# Patient Record
Sex: MALE | Race: BLACK OR AFRICAN AMERICAN | NOT HISPANIC OR LATINO | Employment: OTHER | URBAN - METROPOLITAN AREA
[De-identification: names, ages, dates, MRNs, and addresses within clinical notes are randomized per-mention and may not be internally consistent; named-entity substitution may affect disease eponyms.]

---

## 2023-08-07 ENCOUNTER — APPOINTMENT (OUTPATIENT)
Dept: MRI IMAGING | Facility: CLINIC | Age: 68
End: 2023-08-07
Attending: EMERGENCY MEDICINE
Payer: COMMERCIAL

## 2023-08-07 ENCOUNTER — APPOINTMENT (OUTPATIENT)
Dept: GENERAL RADIOLOGY | Facility: CLINIC | Age: 68
End: 2023-08-07
Attending: EMERGENCY MEDICINE
Payer: COMMERCIAL

## 2023-08-07 ENCOUNTER — HOSPITAL ENCOUNTER (OUTPATIENT)
Facility: CLINIC | Age: 68
Setting detail: OBSERVATION
Discharge: HOME | End: 2023-08-09
Attending: EMERGENCY MEDICINE | Admitting: HOSPITALIST
Payer: COMMERCIAL

## 2023-08-07 DIAGNOSIS — Z86.73 HISTORY OF CVA (CEREBROVASCULAR ACCIDENT): ICD-10-CM

## 2023-08-07 DIAGNOSIS — G45.9 TIA (TRANSIENT ISCHEMIC ATTACK): ICD-10-CM

## 2023-08-07 DIAGNOSIS — R42 DIZZINESS: ICD-10-CM

## 2023-08-07 DIAGNOSIS — I48.91 ATRIAL FIBRILLATION, UNSPECIFIED TYPE (H): Primary | ICD-10-CM

## 2023-08-07 DIAGNOSIS — R55 NEAR SYNCOPE: ICD-10-CM

## 2023-08-07 LAB
ALBUMIN UR-MCNC: 10 MG/DL
ANION GAP SERPL CALCULATED.3IONS-SCNC: 11 MMOL/L (ref 7–15)
APPEARANCE UR: CLEAR
ATRIAL RATE - MUSE: NORMAL BPM
BILIRUB UR QL STRIP: NEGATIVE
BUN SERPL-MCNC: 14.6 MG/DL (ref 8–23)
CALCIUM SERPL-MCNC: 9.1 MG/DL (ref 8.8–10.2)
CHLORIDE SERPL-SCNC: 104 MMOL/L (ref 98–107)
COLOR UR AUTO: YELLOW
CREAT SERPL-MCNC: 1.36 MG/DL (ref 0.67–1.17)
DEPRECATED HCO3 PLAS-SCNC: 25 MMOL/L (ref 22–29)
DIASTOLIC BLOOD PRESSURE - MUSE: NORMAL MMHG
ERYTHROCYTE [DISTWIDTH] IN BLOOD BY AUTOMATED COUNT: 14.2 % (ref 10–15)
GFR SERPL CREATININE-BSD FRML MDRD: 56 ML/MIN/1.73M2
GLUCOSE SERPL-MCNC: 103 MG/DL (ref 70–99)
GLUCOSE UR STRIP-MCNC: NEGATIVE MG/DL
HBA1C MFR BLD: 5.9 %
HCT VFR BLD AUTO: 45.9 % (ref 40–53)
HGB BLD-MCNC: 15 G/DL (ref 13.3–17.7)
HGB UR QL STRIP: NEGATIVE
HOLD SPECIMEN: NORMAL
HYALINE CASTS: 1 /LPF
INTERPRETATION ECG - MUSE: NORMAL
KETONES UR STRIP-MCNC: NEGATIVE MG/DL
LEUKOCYTE ESTERASE UR QL STRIP: NEGATIVE
MAGNESIUM SERPL-MCNC: 1.8 MG/DL (ref 1.7–2.3)
MCH RBC QN AUTO: 29.2 PG (ref 26.5–33)
MCHC RBC AUTO-ENTMCNC: 32.7 G/DL (ref 31.5–36.5)
MCV RBC AUTO: 90 FL (ref 78–100)
MUCOUS THREADS #/AREA URNS LPF: PRESENT /LPF
NITRATE UR QL: NEGATIVE
NT-PROBNP SERPL-MCNC: 1217 PG/ML (ref 0–900)
P AXIS - MUSE: NORMAL DEGREES
PH UR STRIP: 5.5 [PH] (ref 5–7)
PLATELET # BLD AUTO: 213 10E3/UL (ref 150–450)
POTASSIUM SERPL-SCNC: 4.3 MMOL/L (ref 3.4–5.3)
PR INTERVAL - MUSE: NORMAL MS
QRS DURATION - MUSE: 104 MS
QT - MUSE: 434 MS
QTC - MUSE: 429 MS
R AXIS - MUSE: 50 DEGREES
RBC # BLD AUTO: 5.13 10E6/UL (ref 4.4–5.9)
RBC URINE: 1 /HPF
SODIUM SERPL-SCNC: 140 MMOL/L (ref 136–145)
SP GR UR STRIP: 1.02 (ref 1–1.03)
SYSTOLIC BLOOD PRESSURE - MUSE: NORMAL MMHG
T AXIS - MUSE: 208 DEGREES
T4 FREE SERPL-MCNC: 1.25 NG/DL (ref 0.9–1.7)
TROPONIN T SERPL HS-MCNC: 7 NG/L
TROPONIN T SERPL HS-MCNC: 7 NG/L
TSH SERPL DL<=0.005 MIU/L-ACNC: 5.16 UIU/ML (ref 0.3–4.2)
UROBILINOGEN UR STRIP-MCNC: NORMAL MG/DL
VENTRICULAR RATE- MUSE: 59 BPM
WBC # BLD AUTO: 6 10E3/UL (ref 4–11)
WBC URINE: 1 /HPF

## 2023-08-07 PROCEDURE — G1010 CDSM STANSON: HCPCS

## 2023-08-07 PROCEDURE — G0378 HOSPITAL OBSERVATION PER HR: HCPCS

## 2023-08-07 PROCEDURE — 83880 ASSAY OF NATRIURETIC PEPTIDE: CPT | Performed by: EMERGENCY MEDICINE

## 2023-08-07 PROCEDURE — 84439 ASSAY OF FREE THYROXINE: CPT | Performed by: EMERGENCY MEDICINE

## 2023-08-07 PROCEDURE — 84484 ASSAY OF TROPONIN QUANT: CPT | Performed by: EMERGENCY MEDICINE

## 2023-08-07 PROCEDURE — 84443 ASSAY THYROID STIM HORMONE: CPT | Performed by: EMERGENCY MEDICINE

## 2023-08-07 PROCEDURE — 96361 HYDRATE IV INFUSION ADD-ON: CPT

## 2023-08-07 PROCEDURE — 83735 ASSAY OF MAGNESIUM: CPT | Performed by: EMERGENCY MEDICINE

## 2023-08-07 PROCEDURE — 96360 HYDRATION IV INFUSION INIT: CPT

## 2023-08-07 PROCEDURE — 83036 HEMOGLOBIN GLYCOSYLATED A1C: CPT | Performed by: HOSPITALIST

## 2023-08-07 PROCEDURE — 71046 X-RAY EXAM CHEST 2 VIEWS: CPT

## 2023-08-07 PROCEDURE — 80061 LIPID PANEL: CPT | Performed by: HOSPITALIST

## 2023-08-07 PROCEDURE — 70544 MR ANGIOGRAPHY HEAD W/O DYE: CPT | Mod: MG

## 2023-08-07 PROCEDURE — 99223 1ST HOSP IP/OBS HIGH 75: CPT | Performed by: HOSPITALIST

## 2023-08-07 PROCEDURE — 81001 URINALYSIS AUTO W/SCOPE: CPT | Performed by: EMERGENCY MEDICINE

## 2023-08-07 PROCEDURE — 258N000003 HC RX IP 258 OP 636: Performed by: EMERGENCY MEDICINE

## 2023-08-07 PROCEDURE — 85027 COMPLETE CBC AUTOMATED: CPT | Performed by: EMERGENCY MEDICINE

## 2023-08-07 PROCEDURE — 93005 ELECTROCARDIOGRAM TRACING: CPT

## 2023-08-07 PROCEDURE — 82310 ASSAY OF CALCIUM: CPT | Performed by: EMERGENCY MEDICINE

## 2023-08-07 PROCEDURE — 36415 COLL VENOUS BLD VENIPUNCTURE: CPT | Performed by: EMERGENCY MEDICINE

## 2023-08-07 PROCEDURE — 99285 EMERGENCY DEPT VISIT HI MDM: CPT | Mod: 25

## 2023-08-07 RX ORDER — ASPIRIN 325 MG
325 TABLET, DELAYED RELEASE (ENTERIC COATED) ORAL DAILY PRN
Status: ON HOLD | COMMUNITY
End: 2023-08-08

## 2023-08-07 RX ORDER — MULTIVITAMIN WITH IRON
1 TABLET ORAL DAILY
COMMUNITY

## 2023-08-07 RX ORDER — AMLODIPINE BESYLATE 10 MG/1
10 TABLET ORAL DAILY
COMMUNITY

## 2023-08-07 RX ORDER — CYANOCOBALAMIN (VITAMIN B-12) 2500 MCG
2500 TABLET, SUBLINGUAL SUBLINGUAL DAILY
COMMUNITY

## 2023-08-07 RX ORDER — MULTIVITAMIN,THERAPEUTIC
1 TABLET ORAL DAILY
COMMUNITY

## 2023-08-07 RX ORDER — ATENOLOL 50 MG/1
50 TABLET ORAL 2 TIMES DAILY
Status: ON HOLD | COMMUNITY
End: 2023-08-08

## 2023-08-07 RX ORDER — GADOBUTROL 604.72 MG/ML
10 INJECTION INTRAVENOUS ONCE
Status: DISCONTINUED | OUTPATIENT
Start: 2023-08-07 | End: 2023-08-07

## 2023-08-07 RX ORDER — ZINC GLUCONATE 50 MG
50 TABLET ORAL DAILY
COMMUNITY

## 2023-08-07 RX ADMIN — SODIUM CHLORIDE, POTASSIUM CHLORIDE, SODIUM LACTATE AND CALCIUM CHLORIDE 1000 ML: 600; 310; 30; 20 INJECTION, SOLUTION INTRAVENOUS at 14:23

## 2023-08-07 ASSESSMENT — ENCOUNTER SYMPTOMS
DYSURIA: 0
CHILLS: 0
SHORTNESS OF BREATH: 0
COUGH: 0
FEVER: 0
RHINORRHEA: 0
HEMATURIA: 0
NECK PAIN: 0
PALPITATIONS: 1
DIZZINESS: 1
WEAKNESS: 1
HEADACHES: 0
BACK PAIN: 0
ABDOMINAL PAIN: 0
NAUSEA: 0
LIGHT-HEADEDNESS: 1
VOMITING: 0

## 2023-08-07 ASSESSMENT — ACTIVITIES OF DAILY LIVING (ADL)
ADLS_ACUITY_SCORE: 35

## 2023-08-07 NOTE — PROGRESS NOTES
Patient refused to continue with the MRI exams. We were only able to obtain images without the contrast. Patient adamant there is nothing wrong with his brain and refused to continue with the study. RN notified.

## 2023-08-07 NOTE — ED TRIAGE NOTES
Patient BIBA from work. Patient reports fatigue over the last couple days. Patient in A-fib for EMS, he denies any afib hx. Patient denies chest pain or SOB.     Triage Assessment       Row Name 08/07/23 9077       Triage Assessment (Adult)    Airway WDL WDL       Respiratory WDL    Respiratory WDL WDL       Skin Circulation/Temperature WDL    Skin Circulation/Temperature WDL WDL       Cardiac WDL    Cardiac WDL X  intermittent palpitations, A-fib for EMS       Peripheral/Neurovascular WDL    Peripheral Neurovascular WDL WDL       Cognitive/Neuro/Behavioral WDL    Cognitive/Neuro/Behavioral WDL WDL

## 2023-08-07 NOTE — PHARMACY-ADMISSION MEDICATION HISTORY
Pharmacist Admission Medication History    Admission medication history is complete. The information provided in this note is only as accurate as the sources available at the time of the update.    Medication reconciliation/reorder completed by provider prior to medication history? No    Information Source(s): Patient and CareEverywhere/SureScripts via in-person    Pertinent Information:   Patient reports he takes atenolol as needed for headaches although per Surescripts, directions are to take BID. Last filled 3/23 for 180 tabs.  Patient denied using diclofenac gel although last fill was 7/11. This was not added to list    Changes made to PTA medication list:  Added: all meds added to list  Deleted: None  Changed: None    Medication Affordability:  Not including over the counter (OTC) medications, was there a time in the past 3 months when you did not take your medications as prescribed because of cost?: No    Allergies reviewed with patient and updates made in EHR: yes    Medication History Completed By: Felisha Cabrera RPH 8/7/2023 3:55 PM    Prior to Admission medications    Medication Sig Last Dose Taking? Auth Provider Long Term End Date   amLODIPine (NORVASC) 10 MG tablet Take 10 mg by mouth daily 8/7/2023 at AM Yes Unknown, Entered By History Yes    aspirin (ASA) 325 MG EC tablet Take 325 mg by mouth daily as needed for pain Unknown at PRN Yes Unknown, Entered By History     CALCIUM PO Take 1 tablet by mouth daily 8/7/2023 Yes Unknown, Entered By History     multivitamin, therapeutic (THERA-VIT) TABS tablet Take 1 tablet by mouth daily 8/7/2023 Yes Unknown, Entered By History     SELENIUM PO Take 1 tablet by mouth daily 8/7/2023 Yes Unknown, Entered By History     vitamin B-12 (CYANOCOBALAMIN) 2500 MCG sublingual tablet Take 2,500 mcg by mouth daily 8/7/2023 Yes Unknown, Entered By History     vitamin C with B complex (B COMPLEX-C) tablet Take 1 tablet by mouth daily 8/7/2023 Yes Unknown, Entered By History      VITAMIN D PO Take 1 tablet by mouth daily 8/7/2023 Yes Unknown, Entered By History     zinc gluconate 50 MG tablet Take 50 mg by mouth daily 8/7/2023 Yes Unknown, Entered By History     atenolol (TENORMIN) 50 MG tablet Take 50 mg by mouth 2 times daily   Unknown, Entered By History Yes

## 2023-08-07 NOTE — ED PROVIDER NOTES
History     Chief Complaint:  Fatigue and Palpitations       HPI   Rajeev España is a 67 year old male with history of atrial fibrillation not on anticoagulation, hypertension, BIBA due to weakness, dizziness and chest palpitations that started while he was at work earlier today. 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  This occurred within the past hour prior to patient arrival.  Patient states that it is difficult to describe his dizziness, but felt like he was potentially going to pass out and felt generalized weakness.  His symptoms did not resolve after sitting down.  He states that the symptoms lasted roughly 25 minutes or so.  During this time, he felt like his heart was racing. His supervisor called the ambulance due to these symptoms and they gave him an aspirin which alleviated some of his symptoms. He currently feels back to baseline. He he reports that he was diagnosed with irregular heart rhythm in the past and was previously prescribed Eliquis, but after conversation with his primary care physician, decision was made for patient to take 325 mg aspirin instead of Eliquis.  He is currently not anticoagulated.  He denies any history of stroke.  Denies chest pain or shortness of breath.     Review of Systems   Constitutional:  Negative for chills and fever.   HENT:  Negative for congestion and rhinorrhea.    Eyes:  Negative for visual disturbance.   Respiratory:  Negative for cough and shortness of breath.    Cardiovascular:  Positive for palpitations. Negative for chest pain.   Gastrointestinal:  Negative for abdominal pain, nausea and vomiting.   Genitourinary:  Negative for dysuria and hematuria.   Musculoskeletal:  Negative for back pain and neck pain.   Neurological:  Positive for dizziness, weakness and light-headedness. Negative for headaches.  "      Independent Historian:   None - Patient Only    Review of External Notes:   1/10/2023 ED visit note    Medications:    Amlodipine  Aspirin  Atenolol    Past Medical History:    Atrial Fibrillation  HTN    Past Surgical History:    None     Physical Exam   Patient Vitals for the past 24 hrs:   BP Temp Temp src Pulse Resp SpO2 Height Weight   08/07/23 1551 -- -- -- 61 11 99 % -- --   08/07/23 1550 (!) 142/94 -- -- 65 14 -- -- --   08/07/23 1512 -- -- -- 73 13 100 % -- --   08/07/23 1511 -- -- -- 66 15 100 % -- --   08/07/23 1500 (!) 135/91 -- -- 67 19 99 % -- --   08/07/23 1329 -- 96.9  F (36.1  C) Temporal -- -- -- -- --   08/07/23 1327 -- -- -- 61 -- 100 % -- --   08/07/23 1326 (!) 124/95 -- -- -- 10 -- 1.702 m (5' 7\") 81.6 kg (180 lb)        Physical Exam  Constitutional:       General: Not in acute distress.     Appearance: Normal appearance  HENT:      Head: Normocephalic and atraumatic.   Eyes:     Extraocular Movements: Extraocular movements intact.      Conjunctiva/sclera: Conjunctivae normal.      Pupils: Pupils are equal, round, and reactive to light.   Cardiovascular:     Rate and Rhythm: Normal rate and regular rhythm.   Pulmonary:      Effort: Pulmonary effort is normal.      Breath sounds: Normal breath sounds.   Abdominal:      General: Abdomen is flat. There is no distension.      Palpations: Abdomen is soft.      Tenderness: There is no abdominal tenderness.   Musculoskeletal:      Cervical back: Normal range of motion and neck supple. No rigidity.      General: No swelling or deformity.   Skin:     General: Skin is warm and dry.   Neurological:      General: No focal deficit present.     NIHSS: Patient alert and keenly responsive. Able to answer month and age. Able and blink eyes and squeeze hands. No gaze palsy. No visual field deficits. No facial palsy. No arm drift bilaterally. No leg drift bilaterally. No limb ataxia. Able to complete finger nose finger and heel to shin. No sensory " deficits. No language deficits/aphasia. No dysarthria. No extinction/inattention.     NIHSS score of 0.       Mental Status: Alert and oriented to person, place, and time.   Psychiatric:         Mood and Affect: Mood normal.         Behavior: Behavior normal.     Emergency Department Course   ECG  See ED course for independent interpretation.     Imaging:  MR Brain w/o & w Contrast    (Results Pending)   MRA Angiogram Head w/o Contrast    (Results Pending)   MRA Angiogram Neck w/o & w Contrast    (Results Pending)   XR Chest 2 Views    (Results Pending)      Report per radiology    Laboratory:  Labs Ordered and Resulted from Time of ED Arrival to Time of ED Departure   BASIC METABOLIC PANEL - Abnormal       Result Value    Sodium 140      Potassium 4.3      Chloride 104      Carbon Dioxide (CO2) 25      Anion Gap 11      Urea Nitrogen 14.6      Creatinine 1.36 (*)     Calcium 9.1      Glucose 103 (*)     GFR Estimate 56 (*)    NT PROBNP INPATIENT - Abnormal    N terminal Pro BNP Inpatient 1,217 (*)    ROUTINE UA WITH MICROSCOPIC REFLEX TO CULTURE - Abnormal    Color Urine Yellow      Appearance Urine Clear      Glucose Urine Negative      Bilirubin Urine Negative      Ketones Urine Negative      Specific Gravity Urine 1.017      Blood Urine Negative      pH Urine 5.5      Protein Albumin Urine 10 (*)     Urobilinogen Urine Normal      Nitrite Urine Negative      Leukocyte Esterase Urine Negative      Mucus Urine Present (*)     RBC Urine 1      WBC Urine 1      Hyaline Casts Urine 1     TSH WITH FREE T4 REFLEX - Abnormal    TSH 5.16 (*)    CBC WITH PLATELETS - Normal    WBC Count 6.0      RBC Count 5.13      Hemoglobin 15.0      Hematocrit 45.9      MCV 90      MCH 29.2      MCHC 32.7      RDW 14.2      Platelet Count 213     TROPONIN T, HIGH SENSITIVITY - Normal    Troponin T, High Sensitivity 7     MAGNESIUM - Normal    Magnesium 1.8     T4 FREE - Normal    Free T4 1.25     TROPONIN T, HIGH SENSITIVITY - Normal     Troponin T, High Sensitivity 7          Emergency Department Course & Assessments:       Interventions:  Medications   gadobutrol (GADAVIST) injection 10 mL (has no administration in time range)   sodium chloride (PF) 0.9% PF flush 100 mL (has no administration in time range)   lactated ringers BOLUS 1,000 mL (1,000 mLs Intravenous $New Bag 23 1421)        Independent Interpretation (X-rays, CTs, rhythm strip):  None    Assessments/Consultations/Discussion of Management or Tests:     ED Course as of 23 1850   Mon Aug 07, 2023   1344 EKG 12-lead, tracing only  Atrial fibrillation with slow ventricular response.  Rate of 59.  Normal QRS.  Normal QTc.  No acute ST elevation or depression.  No prior ECG for comparison.   1524 N-Terminal Pro BNP Inpatient(!): 1,217   1540 Patient ambulated to the restroom without assistance.       Social Determinants of Health affecting care:   None    Disposition:  Care of the patient was transferred to my colleague Dr. Ramírez pending MRI imaging and CXR.     Impression & Plan    CMS Diagnoses: None    Medical Decision Makin-year-old male as described above presents to the emergency department for sudden onset dizziness, near syncope, and palpitations.  Patient hemodynamically stable at time evaluation.  Afebrile.  NIHSS score of 0.  Patient ambulatory to restroom without assistance.  EKG demonstrates atrial fibrillation with slow ventricular response and patient is in room heart rate between 50s and 60s.  Patient asymptomatic at this time and denies chest pain or shortness of breath.  Patient does appear to have mild speech slurring, but patient states that this is baseline for him and due to his usual dialect and different phonetics, it is difficult for people to understand him and that is not new.  He does not believe that he has any speech slurring.  Differential diagnosis considered includes, but not limited to, posterior circulation CVA, TIA, ACS, or transient  dysrhythmia such as A-fib with RVR resulting with hypotension/near syncope.  Cardiac work-up ordered.  2 set cardiac enzymes.  BNP for evaluation for new heart failure making patient high risk near syncope.  Will obtain MRI brain and MRA for evaluation for CVA/TIA.  Patient is at risk for CVA given atrial fibrillation not on chronic anticoagulation. Given patient is asymptomatic at this time, no indication for tiered stroke alert.  Syncope work-up.  No indication for cardioversion at this time.  Likely observation admission at the very least for further cardiac work-up/monitoring and potential CVA work-up if positive MRI.  Discussed care plan with patient who voiced understanding and agreement with plan.  Answered all questions.  Additional work-up and orders as listed in chart.    Please refer to ED course above for details on the patient's treatment course and any changes or updates in care plan beyond my initial evaluation and MDM.      Diagnosis:    ICD-10-CM    1. Near syncope  R55       2. Dizziness  R42       3. Atrial fibrillation, unspecified type (H)  I48.91            Discharge Medications:  New Prescriptions    No medications on file          LACY HAMEED DO  8/7/2023   Lacy Hameed DO Yeh, Ferris, DO  08/07/23 9295

## 2023-08-07 NOTE — ED NOTES
Bed: ED01  Expected date: 8/7/23  Expected time: 1:11 PM  Means of arrival: Ambulance  Comments:  418 67m new afib  ETA 1310

## 2023-08-07 NOTE — ED NOTES
[facility]  ED Nurse Handoff Report    ED Chief complaint: Fatigue and Palpitations      ED Diagnosis:   Final diagnoses:   None       Code Status: Full Code    Allergies: No Known Allergies    Patient Story: pt had weakness, dizziness and chest palpitations that started while he was at work earlier today. 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 At that time he also noticed that it felt like his chest was fluttering   Focused Assessment:  weakness    Treatments and/or interventions provided: iv, labs   Patient's response to treatments and/or interventions: tolerated     To be done/followed up on inpatient unit:  na    Does this patient have any cognitive concerns?:       Activity level - Baseline/Home:  Independent  Activity Level - Current:   Independent    Patient's Preferred language: Other   Needed?: No    Isolation: None  Infection: Not Applicable  Patient tested for COVID 19 prior to admission: NO  Bariatric?: No    Vital Signs:   Vitals:    08/07/23 1329 08/07/23 1500 08/07/23 1511 08/07/23 1512   BP:  (!) 135/91     Pulse:  67 66 73   Resp:  19 15 13   Temp: 96.9  F (36.1  C)      TempSrc: Temporal      SpO2:  99% 100% 100%   Weight:       Height:           Cardiac Rhythm:Cardiac Rhythm: Atrial fibrillation    Was the PSS-3 completed:   Yes  What interventions are required if any?               Family Comments:   OBS brochure/video discussed/provided to patient/family: N/A              Name of person given brochure if not patient:               Relationship to patient:     For the majority of the shift this patient's behavior was Green.   Behavioral interventions performed were .    ED NURSE PHONE NUMBER: [phone]

## 2023-08-08 ENCOUNTER — APPOINTMENT (OUTPATIENT)
Dept: SPEECH THERAPY | Facility: CLINIC | Age: 68
End: 2023-08-08
Attending: HOSPITALIST
Payer: COMMERCIAL

## 2023-08-08 ENCOUNTER — APPOINTMENT (OUTPATIENT)
Dept: CARDIOLOGY | Facility: CLINIC | Age: 68
End: 2023-08-08
Attending: STUDENT IN AN ORGANIZED HEALTH CARE EDUCATION/TRAINING PROGRAM
Payer: COMMERCIAL

## 2023-08-08 ENCOUNTER — APPOINTMENT (OUTPATIENT)
Dept: PHYSICAL THERAPY | Facility: CLINIC | Age: 68
End: 2023-08-08
Attending: HOSPITALIST
Payer: COMMERCIAL

## 2023-08-08 LAB
ANION GAP SERPL CALCULATED.3IONS-SCNC: 12 MMOL/L (ref 7–15)
BUN SERPL-MCNC: 12.7 MG/DL (ref 8–23)
CALCIUM SERPL-MCNC: 8.8 MG/DL (ref 8.8–10.2)
CHLORIDE SERPL-SCNC: 105 MMOL/L (ref 98–107)
CHOLEST SERPL-MCNC: 155 MG/DL
CREAT SERPL-MCNC: 1.13 MG/DL (ref 0.67–1.17)
DEPRECATED HCO3 PLAS-SCNC: 23 MMOL/L (ref 22–29)
ERYTHROCYTE [DISTWIDTH] IN BLOOD BY AUTOMATED COUNT: 14.1 % (ref 10–15)
GFR SERPL CREATININE-BSD FRML MDRD: 70 ML/MIN/1.73M2
GLUCOSE BLDC GLUCOMTR-MCNC: 84 MG/DL (ref 70–99)
GLUCOSE BLDC GLUCOMTR-MCNC: 88 MG/DL (ref 70–99)
GLUCOSE SERPL-MCNC: 84 MG/DL (ref 70–99)
HCT VFR BLD AUTO: 42.8 % (ref 40–53)
HDLC SERPL-MCNC: 49 MG/DL
HGB BLD-MCNC: 14 G/DL (ref 13.3–17.7)
LDLC SERPL CALC-MCNC: 97 MG/DL
LVEF ECHO: NORMAL
MAGNESIUM SERPL-MCNC: 1.6 MG/DL (ref 1.7–2.3)
MCH RBC QN AUTO: 28.7 PG (ref 26.5–33)
MCHC RBC AUTO-ENTMCNC: 32.7 G/DL (ref 31.5–36.5)
MCV RBC AUTO: 88 FL (ref 78–100)
NONHDLC SERPL-MCNC: 106 MG/DL
PHOSPHATE SERPL-MCNC: 3.4 MG/DL (ref 2.5–4.5)
PLATELET # BLD AUTO: 200 10E3/UL (ref 150–450)
POTASSIUM SERPL-SCNC: 4 MMOL/L (ref 3.4–5.3)
RBC # BLD AUTO: 4.88 10E6/UL (ref 4.4–5.9)
SODIUM SERPL-SCNC: 140 MMOL/L (ref 136–145)
TRIGL SERPL-MCNC: 45 MG/DL
TROPONIN T SERPL HS-MCNC: 8 NG/L
WBC # BLD AUTO: 5.6 10E3/UL (ref 4–11)

## 2023-08-08 PROCEDURE — 80048 BASIC METABOLIC PNL TOTAL CA: CPT | Performed by: HOSPITALIST

## 2023-08-08 PROCEDURE — G0378 HOSPITAL OBSERVATION PER HR: HCPCS

## 2023-08-08 PROCEDURE — 250N000013 HC RX MED GY IP 250 OP 250 PS 637: Performed by: HOSPITALIST

## 2023-08-08 PROCEDURE — 999N000208 ECHOCARDIOGRAM COMPLETE

## 2023-08-08 PROCEDURE — 250N000013 HC RX MED GY IP 250 OP 250 PS 637: Performed by: STUDENT IN AN ORGANIZED HEALTH CARE EDUCATION/TRAINING PROGRAM

## 2023-08-08 PROCEDURE — 85027 COMPLETE CBC AUTOMATED: CPT | Performed by: HOSPITALIST

## 2023-08-08 PROCEDURE — 93306 TTE W/DOPPLER COMPLETE: CPT | Mod: 26 | Performed by: INTERNAL MEDICINE

## 2023-08-08 PROCEDURE — 36415 COLL VENOUS BLD VENIPUNCTURE: CPT | Performed by: HOSPITALIST

## 2023-08-08 PROCEDURE — 93010 ELECTROCARDIOGRAM REPORT: CPT | Performed by: INTERNAL MEDICINE

## 2023-08-08 PROCEDURE — 250N000013 HC RX MED GY IP 250 OP 250 PS 637

## 2023-08-08 PROCEDURE — 83735 ASSAY OF MAGNESIUM: CPT | Performed by: HOSPITALIST

## 2023-08-08 PROCEDURE — 255N000002 HC RX 255 OP 636: Performed by: HOSPITALIST

## 2023-08-08 PROCEDURE — 84484 ASSAY OF TROPONIN QUANT: CPT | Performed by: HOSPITALIST

## 2023-08-08 PROCEDURE — 93005 ELECTROCARDIOGRAM TRACING: CPT

## 2023-08-08 PROCEDURE — 97116 GAIT TRAINING THERAPY: CPT | Mod: GP

## 2023-08-08 PROCEDURE — 97161 PT EVAL LOW COMPLEX 20 MIN: CPT | Mod: GP

## 2023-08-08 PROCEDURE — 99223 1ST HOSP IP/OBS HIGH 75: CPT | Mod: 25 | Performed by: INTERNAL MEDICINE

## 2023-08-08 PROCEDURE — 84100 ASSAY OF PHOSPHORUS: CPT | Performed by: HOSPITALIST

## 2023-08-08 PROCEDURE — 92610 EVALUATE SWALLOWING FUNCTION: CPT | Mod: GN | Performed by: SPEECH-LANGUAGE PATHOLOGIST

## 2023-08-08 PROCEDURE — 82962 GLUCOSE BLOOD TEST: CPT

## 2023-08-08 RX ORDER — ATORVASTATIN CALCIUM 20 MG/1
20 TABLET, FILM COATED ORAL EVERY EVENING
Status: DISCONTINUED | OUTPATIENT
Start: 2023-08-08 | End: 2023-08-09 | Stop reason: HOSPADM

## 2023-08-08 RX ORDER — ASPIRIN 325 MG
325 TABLET, DELAYED RELEASE (ENTERIC COATED) ORAL DAILY
Status: DISCONTINUED | OUTPATIENT
Start: 2023-08-08 | End: 2023-08-08

## 2023-08-08 RX ORDER — ATENOLOL 25 MG/1
50 TABLET ORAL 2 TIMES DAILY
Status: DISCONTINUED | OUTPATIENT
Start: 2023-08-08 | End: 2023-08-08

## 2023-08-08 RX ORDER — METOPROLOL TARTRATE 25 MG/1
25 TABLET, FILM COATED ORAL 2 TIMES DAILY
Qty: 60 TABLET | Refills: 0 | Status: SHIPPED | OUTPATIENT
Start: 2023-08-09 | End: 2023-09-08

## 2023-08-08 RX ORDER — AMOXICILLIN 250 MG
1 CAPSULE ORAL 2 TIMES DAILY PRN
Status: DISCONTINUED | OUTPATIENT
Start: 2023-08-08 | End: 2023-08-09 | Stop reason: HOSPADM

## 2023-08-08 RX ORDER — ACETAMINOPHEN 650 MG/1
650 SUPPOSITORY RECTAL EVERY 6 HOURS PRN
Status: DISCONTINUED | OUTPATIENT
Start: 2023-08-08 | End: 2023-08-09 | Stop reason: HOSPADM

## 2023-08-08 RX ORDER — AMLODIPINE BESYLATE 10 MG/1
10 TABLET ORAL DAILY
Status: DISCONTINUED | OUTPATIENT
Start: 2023-08-08 | End: 2023-08-09 | Stop reason: HOSPADM

## 2023-08-08 RX ORDER — METOPROLOL TARTRATE 25 MG/1
25 TABLET, FILM COATED ORAL 2 TIMES DAILY
Status: DISCONTINUED | OUTPATIENT
Start: 2023-08-09 | End: 2023-08-09 | Stop reason: HOSPADM

## 2023-08-08 RX ORDER — PROCHLORPERAZINE MALEATE 5 MG
5 TABLET ORAL EVERY 6 HOURS PRN
Status: DISCONTINUED | OUTPATIENT
Start: 2023-08-08 | End: 2023-08-09 | Stop reason: HOSPADM

## 2023-08-08 RX ORDER — ACETAMINOPHEN 325 MG/1
650 TABLET ORAL EVERY 6 HOURS PRN
Status: DISCONTINUED | OUTPATIENT
Start: 2023-08-08 | End: 2023-08-09 | Stop reason: HOSPADM

## 2023-08-08 RX ORDER — ASPIRIN 81 MG/1
324 TABLET, CHEWABLE ORAL DAILY
Status: DISCONTINUED | OUTPATIENT
Start: 2023-08-08 | End: 2023-08-08

## 2023-08-08 RX ORDER — ATORVASTATIN CALCIUM 20 MG/1
20 TABLET, FILM COATED ORAL EVERY EVENING
Qty: 30 TABLET | Refills: 0 | Status: SHIPPED | OUTPATIENT
Start: 2023-08-08 | End: 2023-09-07

## 2023-08-08 RX ORDER — AMOXICILLIN 250 MG
2 CAPSULE ORAL 2 TIMES DAILY PRN
Status: DISCONTINUED | OUTPATIENT
Start: 2023-08-08 | End: 2023-08-09 | Stop reason: HOSPADM

## 2023-08-08 RX ORDER — PROCHLORPERAZINE 25 MG
12.5 SUPPOSITORY, RECTAL RECTAL EVERY 12 HOURS PRN
Status: DISCONTINUED | OUTPATIENT
Start: 2023-08-08 | End: 2023-08-09 | Stop reason: HOSPADM

## 2023-08-08 RX ORDER — ONDANSETRON 2 MG/ML
4 INJECTION INTRAMUSCULAR; INTRAVENOUS EVERY 6 HOURS PRN
Status: DISCONTINUED | OUTPATIENT
Start: 2023-08-08 | End: 2023-08-09 | Stop reason: HOSPADM

## 2023-08-08 RX ORDER — ONDANSETRON 4 MG/1
4 TABLET, ORALLY DISINTEGRATING ORAL EVERY 6 HOURS PRN
Status: DISCONTINUED | OUTPATIENT
Start: 2023-08-08 | End: 2023-08-09 | Stop reason: HOSPADM

## 2023-08-08 RX ADMIN — Medication 1 MG: at 00:04

## 2023-08-08 RX ADMIN — SENNOSIDES AND DOCUSATE SODIUM 1 TABLET: 50; 8.6 TABLET ORAL at 22:10

## 2023-08-08 RX ADMIN — ATENOLOL 50 MG: 25 TABLET ORAL at 00:59

## 2023-08-08 RX ADMIN — ASPIRIN 325 MG: 325 TABLET, COATED ORAL at 09:46

## 2023-08-08 RX ADMIN — ATORVASTATIN CALCIUM 20 MG: 20 TABLET, FILM COATED ORAL at 21:11

## 2023-08-08 RX ADMIN — HUMAN ALBUMIN MICROSPHERES AND PERFLUTREN 9 ML: 10; .22 INJECTION, SOLUTION INTRAVENOUS at 10:41

## 2023-08-08 RX ADMIN — APIXABAN 5 MG: 5 TABLET, FILM COATED ORAL at 21:11

## 2023-08-08 ASSESSMENT — ACTIVITIES OF DAILY LIVING (ADL)
ADLS_ACUITY_SCORE: 33

## 2023-08-08 NOTE — ED PROVIDER NOTES
Dr. Birch asked me to follow-up on the neuroimaging of this patient.  He signed this patient out to me at the end of his shift.  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  There is another infarct of a chronic nature also noted on MRI.  The patient also has some posterior circulatory areas of stenosis on the MRA.  I visited with the patient at 2020 hrs. and he is feeling back to baseline and eating at this time.  The patient is in chronic atrial fibrillation and is not on a novel anticoagulant.  He does take aspirin.  The patient is unaware of a specific history of stroke.  Clearly the patient has had several strokes in the past.  The patient will be placed in observation status at this time with Dr. Peter Mayes and stroke neurology will be consulted for further management and workup.     Rajeev Ramírez MD  08/07/23 2024

## 2023-08-08 NOTE — CONSULTS
SW: Spoke with pt regarding housing concerns, request for resources. Lives in [facility]. Pt concerned about ability to pay rent at current apartment. Provided with [facility] emergency programs, [facility] front door phone number, and shelter resources. Pt appreciative of this.     Aracelis Brantley, ERICK  Social Work  [facility]

## 2023-08-08 NOTE — CONSULTS
Patient has Medicare D through [employer], and Medical Assistance through [employer].    Xarelto/Eliquis:  $10.35/mo.     Anushka Rose  Pharmacy Technician/Liaison, Discharge Pharmacy   [phone] (voice or text)  [email or web address]

## 2023-08-08 NOTE — CONSULTS
"Brief Stroke Note:    Rajeev España is a 67 year old male with past medical history significant for atrial fibrillation (on ASA alone), HTN. He presented to the ED 8/7 for evaluation of dizziness and palpitations. MRI brain shows several chronic infarcts, no evidence of acute stroke.     Impression  Episode of dizziness and palpitations without report of focal neurologic deficits. MRI brain negative for acute stroke. MRI does show evidence of chronic infarcts in the cerebellum and right parietal lobes.     History of atrial fibrillation, not on anticoagulation.    Recommendations   - Recommend DOAC for secondary stroke prevention (CHADs-VASc 4)  - LDL 97. Given chronic infarcts and multifocal stenosis, recommend starting Lipitor 20 mg daily with goal LDL 40-70  - A1c within goal <7.0  - Goal BP <140/90 with tighter control associated with decreased overall CV risk, if tolerated  - Therapies, as needed    Patient Follow-up    - in the next 1-2 week(s) with PCP    Discussed with primary team. Full consult to follow 8/9.    Yomaira Stallings, CNP  Vascular Neurology    To page me or covering stroke neurology team member, click here: [facility]  Choose \"On Call\" tab at top, then select \"NEUROLOGY/ALL SITES\" from middle drop-down box, press Enter, then look for \"stroke\" or \"telestroke\" for your site.  "

## 2023-08-08 NOTE — PLAN OF CARE
SLP: ST orders received, chart reviewed and discussed with RN. Pt passed swallow screen, but is now NPO for Cardiology consult. Per report, pt soft spoken, but no obvious dysarthria/aphasia. Will follow peripherally and check in if any SLP needs after medical work up.     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 Pt reported majority of facial paralysis and CVA deficits resolved. Slight right droop and slightly reduced sensation observed with applesauce residue on pt's lip. Pt compensating well and very aware of swallow function. Assessed thin liquids, puree solids and regular solids with mild oral deficits. No overt s/sx of aspiration and slight oral residue cleared with liquid rinse. Pt in agreement with no further SLP services at this time.    Okay to continue a regular diet and thin liquids with standard aspiration precautions.

## 2023-08-08 NOTE — PROGRESS NOTES
08/08/23 1523   Appointment Info   Signing Clinician's Name / Credentials (PT) Roshan Mayes DPT   Quick Adds   Quick Adds Certification   Living Environment   People in Home alone   Current Living Arrangements apartment   Home Accessibility stairs to enter home   Number of Stairs, Main Entrance 5   Stair Railings, Main Entrance railings on both sides of stairs   Transportation Anticipated family or friend will provide   Living Environment Comments Reports lives alone in cousins apartment. Cousin has been living in another state for quite some time. Reports they are coming back next week. Unsure where he will stay after.   Self-Care   Usual Activity Tolerance good   Current Activity Tolerance good   Equipment Currently Used at Home none   Fall history within last six months no   Activity/Exercise/Self-Care Comment Independent w/ mobility and ADLs w/o AD. 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   General Information   Onset of Illness/Injury or Date of Surgery 08/07/23   Referring Physician Maxine Mayes, DO   Patient/Family Therapy Goals Statement (PT) Return to home   Pertinent History of Current Problem (include personal factors and/or comorbidities that impact the POC) Rajeev España is a 67 year old male admitted on 8/7/2023 with weakness, dizziness and palpitations. Found to have chronic cerebellar infarcts   Existing Precautions/Restrictions fall   Cognition   Affect/Mental Status (Cognition) WFL   Orientation Status (Cognition) oriented x 4   Follows Commands (Cognition) WFL   Pain Assessment   Patient Currently in Pain No   Integumentary/Edema   Integumentary/Edema no deficits were identifed   Posture    Posture Not impaired   Range of Motion (ROM)   ROM Comment WFLs for mobility and transfers no formal testing completed   Strength (Manual Muscle Testing)   Strength Comments WFLs for BLEs on strength testing   Bed Mobility   Comment, (Bed Mobility) Supine to sit w/  independence   Transfers   Comment, (Transfers) Sit to stand w/ SBA   Gait/Stairs (Locomotion)   Comment, (Gait/Stairs) 10 ft w/ no AD and SBA   Balance   Balance Comments No overt LOB noted   Coordination   Coordination Comments Pt w/ some difficulty w/ finger to nose testing and oposition testing. Unclear if this is d/t language barrier or decreased coordination. Able to sequence correctly but not as accurate w/ finger to nose.   Clinical Impression   Criteria for Skilled Therapeutic Intervention Yes, treatment indicated   PT Diagnosis (PT) Impaired ambulation   Influenced by the following impairments Impaired balance and activity tolerance   Functional limitations due to impairments Impaired ADLs, IADLs and functional mobility   Clinical Presentation (PT Evaluation Complexity) Stable/Uncomplicated   Clinical Presentation Rationale Clinical judgment   Clinical Decision Making (Complexity) low complexity   Planned Therapy Interventions (PT) balance training;bed mobility training;gait training;home exercise program;patient/family education;stair training;strengthening;transfer training;progressive activity/exercise   Risk & Benefits of therapy have been explained evaluation/treatment results reviewed;care plan/treatment goals reviewed;risks/benefits reviewed;current/potential barriers reviewed;participants voiced agreement with care plan;participants included;patient   PT Total Evaluation Time   PT Eval, Low Complexity Minutes (93479) 10   Therapy Certification   Start of care date 08/08/23   Certification date from 08/08/23   Certification date to 08/13/23   Medical Diagnosis TIA   Physical Therapy Goals   PT Frequency One time eval and treatment only   PT Predicted Duration/Target Date for Goal Attainment 08/11/23   PT Goals Bed Mobility;Transfers;Gait;Stairs   PT: Bed Mobility Independent;Supine to/from sit;Goal Met   PT: Transfers Independent;Sit to/from stand;Goal Met   PT: Gait Independent;150 feet;Goal Met    PT: Stairs Independent;6 stairs;Rail on both sides;Goal Met   Interventions   Interventions Quick Adds Gait Training   Gait Training   Gait Training Minutes (06002) 10   Symptoms Noted During/After Treatment (Gait Training) none   Treatment Detail/Skilled Intervention Pt supine in bed at start of session. Agreeable to PT. Completing bed mobility independently. Sit to stand x2 independently w/o AD. Ambulating ~250 ft w/ no AD and SBA progressing to independence. Fast stacy noted. Completing dynamic gait w/ change in stacy and head turns w/o LOB. Pt completing side stepping and retro gait w/o LOB. x6 stairs completed w/ SBA progressing to independence w/ bilateral rails. Quick stacy throughout session. Supine in bed at end of session w/ all needs met. Requesting to speak to  at end of session.   PT Discharge Planning   PT Plan DC   PT Discharge Recommendation (DC Rec) home   PT Rationale for DC Rec Pt at or near baseline mobility. Lives in cousins apartment. Stairs to complete. Independent at baseline w/o AD. Works currently in the food industry. Currently independent w/ mobility. Anticipate when medically ready can DC home.   PT Brief overview of current status independent w/o AD   Total Session Time   Timed Code Treatment Minutes 10   Total Session Time (sum of timed and untimed services) 20   [facility]  OUTPATIENT PHYSICAL THERAPY EVALUATION  PLAN OF TREATMENT FOR OUTPATIENT REHABILITATION  (COMPLETE FOR INITIAL CLAIMS ONLY)  Patient's Last Name, First Name, M.I.  YOB: 1955  Rajeev España                        Provider's Name  [facility] Medical Record No.  [id number]                             Onset Date:  08/07/23   Start of Care Date:  08/08/23   Type:     _X_PT   ___OT   ___SLP Medical Diagnosis:  TIA              PT Diagnosis:  Impaired ambulation Visits from SOC:  1     See note for plan of treatment,  functional goals and certification details    I CERTIFY THE NEED FOR THESE SERVICES FURNISHED UNDER        THIS PLAN OF TREATMENT AND WHILE UNDER MY CARE     (Physician co-signature of this document indicates review and certification of the therapy plan).

## 2023-08-08 NOTE — PROGRESS NOTES
Observation goals  PRIOR TO DISCHARGE       Comments: -diagnostic tests and consults completed and resulted- Not Met  -vital signs normal or at patient baseline- Met  -returns to baseline functional status- Not Met  Nurse to notify provider when observation goals have been met and patient is ready for discharge.    Ordered STAT EKG due to pauses in beats

## 2023-08-08 NOTE — PROGRESS NOTES
Paged and sent Vocera messages to Gustabo Mercado regarding the  abnormal pauses in the EKG result.

## 2023-08-08 NOTE — UTILIZATION REVIEW
"  Admission Status; Secondary Review Determination         Under the authority of the Utilization Management Committee, the utilization review process indicated a secondary review on the above patient.  The review outcome is based on review of the medical records, discussions with staff, and applying clinical experience noted on the date of the review.        ()      Inpatient Status Appropriate - This patient's medical care is consistent with medical management for inpatient care and reasonable inpatient medical practice.      (X) Observation Status Appropriate - This patient does not meet hospital inpatient criteria and is placed in observation status. If this patient's primary payer is Medicare and was admitted as an inpatient, Condition Code 44 should be used and patient status changed to \"observation\".   () Admission Status NOT Appropriate - This patient's medical care is not consistent with medical management for Inpatient or Observation Status.          RATIONALE FOR DETERMINATION     67 year old male who presented from work with onset of weakness, dizziness and chest palpitations.  Imaging revealed multiple chronic cerebellar infarcts.  Patient has had atrial fibrillation.  Plan is for anticoagulation and rate control.  Observation status is appropriate    The severity of illness, intensity of service provided, expected LOS and risk for adverse outcome make the care complex, high risk and appropriate for hospital admission.        The information on this document is developed by the utilization review team in order for the business office to ensure compliance.  This only denotes the appropriateness of proper admission status and does not reflect the quality of care rendered.         The definitions of Inpatient Status and Observation Status used in making the determination above are those provided in the CMS Coverage Manual, Chapter 1 and Chapter 6, section 70.4.      Sincerely,     Heraclio Mcneil, " MD  Physician Advisor  Utilization Review/ Case Management  [facility].

## 2023-08-08 NOTE — PLAN OF CARE
OT: Order received, chart reviewed and discussed with care team. Per PT screen, patient is at his ADL baseline with intact cognition. No OT needs at this time. Defer discharge recommendations to PT. Will complete orders.

## 2023-08-08 NOTE — PLAN OF CARE
Goal Outcome Evaluation:  8/8/23 7 a to 3 p    Orientation: A&O    Vitals/Tele: VSS on RA, no pain, Afib Chad , showed some pauses early today, Cardio not concerned.    IV Access/drains: PIV SL    Diet: 2 Gm Na    Mobility: Independent    GI/: Continent x 2    Wound/Skin: No skin issues    Consults: Neurology consult pending, Possible discharge tomorrow    Discharge Plan: Possible discharge tomorrow. SW following      See Flow sheets for assessment

## 2023-08-08 NOTE — H&P
[facility]    History and Physical - Hospitalist Service       Date of Admission:  8/7/2023    Assessment & Plan      Rajeev España is a 67 year old male admitted on 8/7/2023 with weakness, dizziness and palpitations. Found to have chronic cerebellar infarcts    Weakness, dizziness, chest palpitations, near syncope  ? TIA for etiology vs orthostasis vs run of afib with RVR. No new infarcts seen on what could be done of imaging. UA WNL  - neuro consult  - q4h neuro checks  - PT/OT/SLP given evidence of old strokes  - echocardiogram  - orthostatic vitals  - telemetry    Multiple chronic cerebellar infarcts  Small chronic cortical/subcortical right parietal infarct  *8/7 head MRI: no acute/early subacute infarct. Multiple chronic cerebellar infarcts. Small chronic cortical/subcortical right parietal infarct. Brain atrophy and presumed chronic small vessel ischemic changes  *8/7 head MRA: mod focal stenosis of distal P2/prox P3 left posterior cerebral artery. Mild scattered areas of luminal irregularity/narrowing involving anterior/posterior circ.  *8/7 neck MRA: limited. Focal areas of signal drop-out along posteromedial aspects of both ICA, no flow limiting stenosis of ICAs. Mod nonspecific focal stenosis V2 segment of left vertebral artery  - stroke neuro consultation for risk modification, stroke education  - check lipid profile, hgba1c and trend troponin  - PTA only on ASA 325mg, should be back on AC--pharmacy liaison consulted for pricing (he mentioned eliquis was ~700 per month)    Atrial fibrillation not on anticoagulation, rate controlled  Hypertension  Has eliquis at home, but does not take? BNP 1217, Trop 7--7  PTA amlodipine 10mg daily, atenolol 50mg BID  - resumed PTA atenolol, he evidently takes this as needed??  - Pharmacy liaison consult for DOAC cost  - await CXR    Elevated TSH  Normal T4, recommend TSH recheck in 4 weeks with PCP    CKD stage 2  Cr 1.36 admit, but was  "1.12 on 1/10/23.  - BMP in AM    Hx GIST  Hx surgical resection in 2015 per patient     Diet:  regular diet  DVT Prophylaxis: Pneumatic Compression Devices  Walton Catheter: Not present  Lines: None     Cardiac Monitoring: None  Code Status:  full code    Clinically Significant Risk Factors Present on Admission                  # Drug Induced Platelet Defect: home medication list includes an antiplatelet medication        # Overweight: Estimated body mass index is 28.19 kg/m  as calculated from the following:    Height as of this encounter: 1.702 m (5' 7\").    Weight as of this encounter: 81.6 kg (180 lb).            Disposition Plan      Expected Discharge Date: 08/08/2023                  Maxine Mayes DO  Hospitalist Service  [facility]  Securely message with [employer] (more info)  Text page via [facility] Paging/Directory     ______________________________________________________________________    Chief Complaint   Weakness, dizziness, palpitations    History is obtained from the patient, ED provider    History of Present Illness   Rajeev España is a 67 year old male who presents from work with onset of weakness, dizziness and chest palpitations. 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 He had to get assistance to stand up from his coworkers to sit in break room. He felt his heart was pumping very fast. He did not pass out. He denied feeling too hot before this or missing meal. Symptoms lasted about 25 min, EMS arrived, they gave him an aspirin and he felt his symptoms resolved. Since being in the ED he has felt fine. He was surprised to hear about the strokes on his imaging. He knows that he has an irregular HR, but has only been taking ASA 325mg for the last few years after speaking with his PCP.      Past Medical History    Atrial fibrillation  Hypertension  Cataracts  Glaucoma  GIST    Past Surgical History "   Cataract surgery    Prior to Admission Medications   Prior to Admission Medications   Prescriptions Last Dose Informant Patient Reported? Taking?   CALCIUM PO 8/7/2023 Self Yes Yes   Sig: Take 1 tablet by mouth daily   SELENIUM PO 8/7/2023 Self Yes Yes   Sig: Take 1 tablet by mouth daily   VITAMIN D PO 8/7/2023 Self Yes Yes   Sig: Take 1 tablet by mouth daily   amLODIPine (NORVASC) 10 MG tablet 8/7/2023 at AM Self Yes Yes   Sig: Take 10 mg by mouth daily   aspirin (ASA) 325 MG EC tablet Unknown at PRN Self Yes Yes   Sig: Take 325 mg by mouth daily as needed for pain   atenolol (TENORMIN) 50 MG tablet  Self Yes No   Sig: Take 50 mg by mouth 2 times daily   multivitamin, therapeutic (THERA-VIT) TABS tablet 8/7/2023 Self Yes Yes   Sig: Take 1 tablet by mouth daily   vitamin B-12 (CYANOCOBALAMIN) 2500 MCG sublingual tablet 8/7/2023 Self Yes Yes   Sig: Take 2,500 mcg by mouth daily   vitamin C with B complex (B COMPLEX-C) tablet 8/7/2023 Self Yes Yes   Sig: Take 1 tablet by mouth daily   zinc gluconate 50 MG tablet 8/7/2023 Self Yes Yes   Sig: Take 50 mg by mouth daily      Facility-Administered Medications: None           Physical Exam   Vital Signs: Temp: 96.9  F (36.1  C) Temp src: Temporal BP: 124/87 Pulse: 75   Resp: 15 SpO2: 98 % O2 Device: None (Room air)    Weight: 180 lbs 0 oz    Constitutional: Awake, alert, cooperative, no apparent distress  Respiratory: Clear to auscultation bilaterally, no crackles or wheezing  Cardiovascular: Regular rate and rhythm, normal S1 and S2, and no murmur noted  GI: Normal bowel sounds, soft, non-distended, non-tender  Skin/Integumen: No rashes, no cyanosis, no edema  Other:  Strength 5/5 bilat upper and lower extremities. Oriented x4. Sensation intact. No drift.    Medical Decision Making       75 MINUTES SPENT BY ME on the date of service doing chart review, history, exam, documentation & further activities per the note.      Data     I have personally reviewed the following  data over the past 24 hrs:    6.0  \   15.0   / 213     140 104 14.6 /  103 (H)   4.3 25 1.36 (H) \     Trop: 7 BNP: 1,217 (H)     TSH: 5.16 (H) T4: 1.25 A1C: N/A     Imaging results reviewed over the past 24 hrs:   Recent Results (from the past 24 hour(s))   MRA Angiogram Head w/o Contrast    Narrative    EXAM: MR BRAIN W/O CONTRAST, MRA NECK (CAROTIDS) W/O CONTRAST, MRA BRAIN ([place]) W/O CONTRAST  LOCATION: [facility]  DATE: 8/7/2023    INDICATION: Sudden onset dizziness, A-fib not on anticoagulant, eval for posterior circulation CVA  COMPARISON: None.  TECHNIQUE:   1) Multiplanar multisequence head MRI without and with intravenous contrast, including sagittal T1, axial DWI, axial T2 fat-sat and axial T2 FLAIR fat-sat sequences. Of note, the patient was unable to tolerate/declined to continue the remainder of the   planned study and no susceptibility-sensitive sequences were able to be obtained.  2) 3D time-of-flight head MRA without intravenous contrast.  3) 2-D time-of-flight neck MRA without contrast. Stenosis measurements made according to NASCET criteria unless otherwise specified. Of note, the patient was unable to tolerate/declined to continue the planned postcontrast portions of this MRA neck exam.    FINDINGS:  HEAD MRI:  INTRACRANIAL CONTENTS: No abnormal intracranial restricted diffusion is identified to suggest recent infarct. There are multiple small chronic infarcts involving the bilateral cerebellar hemispheres. Small area of T2 hyperintense signal in the right   pelvis may represent dilated perivascular spaces or possibly a small chronic infarct. Small area of T2 FLAIR hyperintense signal involving the subcortical white matter of the right parietal lobe (series 801 image 19), with some associated volume loss,   likely representing a small chronic cortical/subcortical infarct. Mild to moderate extent of patchy nonspecific T2 FLAIR hyperintense signal abnormality  in the cerebral white matter, likely due to chronic small vessel ischemic disease. Somewhat prominent   dilated perivascular spaces in the bilateral basal ganglia regions. There is mild generalized brain parenchymal volume loss. There is mild presumed ex vacuo dilatation of the supratentorial ventricular system. No definite acute intracranial hemorrhage   identified, accounting for limitations of technique. No extra-axial fluid collection, contour deforming mass lesion, or significant mass effect/herniation.    SELLA: No abnormality accounting for technique.    OSSEOUS STRUCTURES/SOFT TISSUES: Mildly heterogeneous nonspecific marrow signal change in the upper cervical spine with overall probable benign pattern. The calvarial/skull base and facial marrow signal appears unremarkable. The major intracranial   vascular flow voids are maintained.     ORBITS: No abnormality accounting for technique.     SINUSES/MASTOIDS: Mild mucosal thickening seen primarily in the ethmoid sinuses. Small retention cyst or polyp in the left maxillary sinus. No middle ear or mastoid effusion.     HEAD MRA:   ANTERIOR CIRCULATION: There is mild luminal irregularity involving the right greater than left carotid siphons without significant stenosis. No high-grade stenosis involving the proximal branches of the anterior cerebral and middle cerebral arteries is   noted.    POSTERIOR CIRCULATION: There appears to be at least moderate focal stenosis of the distal P2/proximal P3 segment of the left posterior cerebral artery (series 505 image 7). Otherwise, mild scattered stenoses involving the bilateral posterior cerebral   arteries that may be due to atherosclerosis. The bilateral vertebral arteries and the basilar artery are patent. No aneurysm or high flow vascular malformation is seen.    NECK MRA:   Evaluation is somewhat limited due to artifact. There are apparent areas of focal signal dropout along the posteromedial aspects of both proximal  internal carotid arteries that are most likely due to artifact (series 2 images 24-25). Otherwise, the   bilateral common carotid arteries and cervical internal carotid arteries appear to be patent where visualized without definite flow-limiting stenosis. Expected areas of segmental signal dropout in the V3 segments of both vertebral arteries is most likely   artifactual. Otherwise, the bilateral cervical vertebral arteries appear to be patent with the exception of a focal area of nonspecific moderate stenosis involving the mid V2 segment of the left vertebral artery (series 700 image 22).    Bovine origin left common carotid artery. No significant stenosis at the origin of the great vessels.      Impression    IMPRESSION:  HEAD MRI:   1.  Evaluation mildly limited due to patient unable to tolerate/declined to complete the entire planned/protocoled exam. No definite acute intracranial process. Specifically, no findings of acute/early subacute infarct.  2.  Multiple chronic cerebellar infarcts.  3.  Small chronic cortical/subcortical right parietal infarct.  4.  Brain atrophy and presumed chronic small vessel ischemic changes.    HEAD MRA:   1.  Moderate focal stenosis of the distal P2/proximal P3 segment of the left posterior cerebral artery.  2.  Otherwise, mild scattered areas of luminal irregularity/narrowing involving the anterior and posterior circulation, as described. This may be due to atherosclerosis.  3.  No intracranial aneurysm or high flow vascular malformation.    NECK MRA:  1.  Evaluation somewhat limited due to patient unable to tolerate/declined to complete the entire planned/protocoled exam. Evaluation limited by noncontrast technique. Only noncontrast 2-D time-of-flight images were able to be obtained through the neck.  2.  Focal areas of signal dropout along the posteromedial aspects of both internal carotid arteries are likely due to artifact. No definite flow-limiting stenosis of the internal  carotid arteries.  3.  There appears to be up to moderate nonspecific focal stenosis of the V2 segment of the left vertebral artery.  4.  CTA could be considered for further evaluation if clinically warranted.   MR Brain w/o Contrast    Narrative    EXAM: MR BRAIN W/O CONTRAST, MRA NECK (CAROTIDS) W/O CONTRAST, MRA BRAIN ([place]) W/O CONTRAST  LOCATION: [facility]  DATE: 8/7/2023    INDICATION: Sudden onset dizziness, A-fib not on anticoagulant, eval for posterior circulation CVA  COMPARISON: None.  TECHNIQUE:   1) Multiplanar multisequence head MRI without and with intravenous contrast, including sagittal T1, axial DWI, axial T2 fat-sat and axial T2 FLAIR fat-sat sequences. Of note, the patient was unable to tolerate/declined to continue the remainder of the   planned study and no susceptibility-sensitive sequences were able to be obtained.  2) 3D time-of-flight head MRA without intravenous contrast.  3) 2-D time-of-flight neck MRA without contrast. Stenosis measurements made according to NASCET criteria unless otherwise specified. Of note, the patient was unable to tolerate/declined to continue the planned postcontrast portions of this MRA neck exam.    FINDINGS:  HEAD MRI:  INTRACRANIAL CONTENTS: No abnormal intracranial restricted diffusion is identified to suggest recent infarct. There are multiple small chronic infarcts involving the bilateral cerebellar hemispheres. Small area of T2 hyperintense signal in the right   pelvis may represent dilated perivascular spaces or possibly a small chronic infarct. Small area of T2 FLAIR hyperintense signal involving the subcortical white matter of the right parietal lobe (series 801 image 19), with some associated volume loss,   likely representing a small chronic cortical/subcortical infarct. Mild to moderate extent of patchy nonspecific T2 FLAIR hyperintense signal abnormality in the cerebral white matter, likely due to chronic small  vessel ischemic disease. Somewhat prominent   dilated perivascular spaces in the bilateral basal ganglia regions. There is mild generalized brain parenchymal volume loss. There is mild presumed ex vacuo dilatation of the supratentorial ventricular system. No definite acute intracranial hemorrhage   identified, accounting for limitations of technique. No extra-axial fluid collection, contour deforming mass lesion, or significant mass effect/herniation.    SELLA: No abnormality accounting for technique.    OSSEOUS STRUCTURES/SOFT TISSUES: Mildly heterogeneous nonspecific marrow signal change in the upper cervical spine with overall probable benign pattern. The calvarial/skull base and facial marrow signal appears unremarkable. The major intracranial   vascular flow voids are maintained.     ORBITS: No abnormality accounting for technique.     SINUSES/MASTOIDS: Mild mucosal thickening seen primarily in the ethmoid sinuses. Small retention cyst or polyp in the left maxillary sinus. No middle ear or mastoid effusion.     HEAD MRA:   ANTERIOR CIRCULATION: There is mild luminal irregularity involving the right greater than left carotid siphons without significant stenosis. No high-grade stenosis involving the proximal branches of the anterior cerebral and middle cerebral arteries is   noted.    POSTERIOR CIRCULATION: There appears to be at least moderate focal stenosis of the distal P2/proximal P3 segment of the left posterior cerebral artery (series 505 image 7). Otherwise, mild scattered stenoses involving the bilateral posterior cerebral   arteries that may be due to atherosclerosis. The bilateral vertebral arteries and the basilar artery are patent. No aneurysm or high flow vascular malformation is seen.    NECK MRA:   Evaluation is somewhat limited due to artifact. There are apparent areas of focal signal dropout along the posteromedial aspects of both proximal internal carotid arteries that are most likely due to  artifact (series 2 images 24-25). Otherwise, the   bilateral common carotid arteries and cervical internal carotid arteries appear to be patent where visualized without definite flow-limiting stenosis. Expected areas of segmental signal dropout in the V3 segments of both vertebral arteries is most likely   artifactual. Otherwise, the bilateral cervical vertebral arteries appear to be patent with the exception of a focal area of nonspecific moderate stenosis involving the mid V2 segment of the left vertebral artery (series 700 image 22).    Bovine origin left common carotid artery. No significant stenosis at the origin of the great vessels.      Impression    IMPRESSION:  HEAD MRI:   1.  Evaluation mildly limited due to patient unable to tolerate/declined to complete the entire planned/protocoled exam. No definite acute intracranial process. Specifically, no findings of acute/early subacute infarct.  2.  Multiple chronic cerebellar infarcts.  3.  Small chronic cortical/subcortical right parietal infarct.  4.  Brain atrophy and presumed chronic small vessel ischemic changes.    HEAD MRA:   1.  Moderate focal stenosis of the distal P2/proximal P3 segment of the left posterior cerebral artery.  2.  Otherwise, mild scattered areas of luminal irregularity/narrowing involving the anterior and posterior circulation, as described. This may be due to atherosclerosis.  3.  No intracranial aneurysm or high flow vascular malformation.    NECK MRA:  1.  Evaluation somewhat limited due to patient unable to tolerate/declined to complete the entire planned/protocoled exam. Evaluation limited by noncontrast technique. Only noncontrast 2-D time-of-flight images were able to be obtained through the neck.  2.  Focal areas of signal dropout along the posteromedial aspects of both internal carotid arteries are likely due to artifact. No definite flow-limiting stenosis of the internal carotid arteries.  3.  There appears to be up to  moderate nonspecific focal stenosis of the V2 segment of the left vertebral artery.  4.  CTA could be considered for further evaluation if clinically warranted.   MRA Angiogram Neck w/o Contrast    Narrative    EXAM: MR BRAIN W/O CONTRAST, MRA NECK (CAROTIDS) W/O CONTRAST, MRA BRAIN ([place]) W/O CONTRAST  LOCATION: [facility]  DATE: 8/7/2023    INDICATION: Sudden onset dizziness, A-fib not on anticoagulant, eval for posterior circulation CVA  COMPARISON: None.  TECHNIQUE:   1) Multiplanar multisequence head MRI without and with intravenous contrast, including sagittal T1, axial DWI, axial T2 fat-sat and axial T2 FLAIR fat-sat sequences. Of note, the patient was unable to tolerate/declined to continue the remainder of the   planned study and no susceptibility-sensitive sequences were able to be obtained.  2) 3D time-of-flight head MRA without intravenous contrast.  3) 2-D time-of-flight neck MRA without contrast. Stenosis measurements made according to NASCET criteria unless otherwise specified. Of note, the patient was unable to tolerate/declined to continue the planned postcontrast portions of this MRA neck exam.    FINDINGS:  HEAD MRI:  INTRACRANIAL CONTENTS: No abnormal intracranial restricted diffusion is identified to suggest recent infarct. There are multiple small chronic infarcts involving the bilateral cerebellar hemispheres. Small area of T2 hyperintense signal in the right   pelvis may represent dilated perivascular spaces or possibly a small chronic infarct. Small area of T2 FLAIR hyperintense signal involving the subcortical white matter of the right parietal lobe (series 801 image 19), with some associated volume loss,   likely representing a small chronic cortical/subcortical infarct. Mild to moderate extent of patchy nonspecific T2 FLAIR hyperintense signal abnormality in the cerebral white matter, likely due to chronic small vessel ischemic disease. Somewhat  prominent   dilated perivascular spaces in the bilateral basal ganglia regions. There is mild generalized brain parenchymal volume loss. There is mild presumed ex vacuo dilatation of the supratentorial ventricular system. No definite acute intracranial hemorrhage   identified, accounting for limitations of technique. No extra-axial fluid collection, contour deforming mass lesion, or significant mass effect/herniation.    SELLA: No abnormality accounting for technique.    OSSEOUS STRUCTURES/SOFT TISSUES: Mildly heterogeneous nonspecific marrow signal change in the upper cervical spine with overall probable benign pattern. The calvarial/skull base and facial marrow signal appears unremarkable. The major intracranial   vascular flow voids are maintained.     ORBITS: No abnormality accounting for technique.     SINUSES/MASTOIDS: Mild mucosal thickening seen primarily in the ethmoid sinuses. Small retention cyst or polyp in the left maxillary sinus. No middle ear or mastoid effusion.     HEAD MRA:   ANTERIOR CIRCULATION: There is mild luminal irregularity involving the right greater than left carotid siphons without significant stenosis. No high-grade stenosis involving the proximal branches of the anterior cerebral and middle cerebral arteries is   noted.    POSTERIOR CIRCULATION: There appears to be at least moderate focal stenosis of the distal P2/proximal P3 segment of the left posterior cerebral artery (series 505 image 7). Otherwise, mild scattered stenoses involving the bilateral posterior cerebral   arteries that may be due to atherosclerosis. The bilateral vertebral arteries and the basilar artery are patent. No aneurysm or high flow vascular malformation is seen.    NECK MRA:   Evaluation is somewhat limited due to artifact. There are apparent areas of focal signal dropout along the posteromedial aspects of both proximal internal carotid arteries that are most likely due to artifact (series 2 images 24-25).  Otherwise, the   bilateral common carotid arteries and cervical internal carotid arteries appear to be patent where visualized without definite flow-limiting stenosis. Expected areas of segmental signal dropout in the V3 segments of both vertebral arteries is most likely   artifactual. Otherwise, the bilateral cervical vertebral arteries appear to be patent with the exception of a focal area of nonspecific moderate stenosis involving the mid V2 segment of the left vertebral artery (series 700 image 22).    Bovine origin left common carotid artery. No significant stenosis at the origin of the great vessels.      Impression    IMPRESSION:  HEAD MRI:   1.  Evaluation mildly limited due to patient unable to tolerate/declined to complete the entire planned/protocoled exam. No definite acute intracranial process. Specifically, no findings of acute/early subacute infarct.  2.  Multiple chronic cerebellar infarcts.  3.  Small chronic cortical/subcortical right parietal infarct.  4.  Brain atrophy and presumed chronic small vessel ischemic changes.    HEAD MRA:   1.  Moderate focal stenosis of the distal P2/proximal P3 segment of the left posterior cerebral artery.  2.  Otherwise, mild scattered areas of luminal irregularity/narrowing involving the anterior and posterior circulation, as described. This may be due to atherosclerosis.  3.  No intracranial aneurysm or high flow vascular malformation.    NECK MRA:  1.  Evaluation somewhat limited due to patient unable to tolerate/declined to complete the entire planned/protocoled exam. Evaluation limited by noncontrast technique. Only noncontrast 2-D time-of-flight images were able to be obtained through the neck.  2.  Focal areas of signal dropout along the posteromedial aspects of both internal carotid arteries are likely due to artifact. No definite flow-limiting stenosis of the internal carotid arteries.  3.  There appears to be up to moderate nonspecific focal stenosis of  the V2 segment of the left vertebral artery.  4.  CTA could be considered for further evaluation if clinically warranted.

## 2023-08-08 NOTE — CONSULTS
Cardiology Consultation      Rajeev España MRN# [id number]   YOB: 1955 Age: 67 year old   Date of Admission: 8/7/2023     Reason for consult: Atrial fibrillation and evidence of strokes           Assessment and Plan:     Chronic atrial fibrillation at least as far back as January 2023 when he was seen in the emergency department  States that his primary clinician told him to take aspirin instead of Eliquis which he does have at home.  Patient has not been consistent with atenolol and the dose is fairly strong, he received 50 mg at midnight and has had some intermittent bradycardia..  Discontinue atenolol and start metoprolol 25 mg twice daily starting tomorrow  Discharge home with 30-day event monitor to assess for bradycardia  Follow-up as an outpatient in cardiology clinic with DARCY in 1 month.  Anticoagulation on discharge.          High complexity medical decision making  Chronic illness with severe exacerbation, progression: Atrial fibrillation and evidence of past gross    High complexity data review  Unique tests ordered: 30-day event monitor  Unique results reviewed: Chest XR, MR a MRI brain, labs 8/7/2023  Independent interpretation of a test performed by another physician: ECG from 8/7/2023, echo images from 8/8/2023 with normal LV function  External documents reviewed: [facility] emergency department visit from January 2023             Chief Complaint:   Fatigue and Palpitations           History of Present Illness:   This patient is a 67 year old [place]  male who states he has a history of stroke in 1976, he was also diagnosed with atrial fibrillation with most recent visit to the emergency department January 2023 at [facility].  He was recommended to take anticoagulation and he picked up a prescription for Eliquis that cost $700.  Upon recommendation of his primary clinician later he did not start the Eliquis and used aspirin instead.    He presented with imbalance and found to have  "cerebellar strokes that were old on MRA/MRI.  He is in chronic atrial fibrillation with variable heart rate response.  He was given 50 mg of atenolol at midnight and had some asymptomatic bradycardia.  Of note he does have some renal dysfunction and atenolol is renally cleared.    I reviewed the images of his echocardiogram done today and it appears to have normal LV function without any significant valvular heart disease.  ECG without gross ischemia.  Troponins without elevation.         Physical Exam:   Vitals were reviewed  Blood pressure 130/79, pulse 76, temperature 97.2  F (36.2  C), temperature source Oral, resp. rate 16, height 1.702 m (5' 7\"), weight 83.5 kg (184 lb 1.6 oz), SpO2 100 %.  Temperatures:  Current - Temp: 97.2  F (36.2  C); Max - Temp  Av.4  F (36.3  C)  Min: 96.9  F (36.1  C)  Max: 97.8  F (36.6  C)  Respiration range: Resp  Av.3  Min: 10  Max: 19  Pulse range: Pulse  Av.2  Min: 54  Max: 79  Blood pressure range: Systolic (24hrs), Av , Min:105 , Max:142   ; Diastolic (24hrs), Av, Min:68, Max:99    Pulse oximetry range: SpO2  Av.4 %  Min: 95 %  Max: 100 %    Intake/Output Summary (Last 24 hours) at 2023 1102  Last data filed at 2023 0000  Gross per 24 hour   Intake --   Output 200 ml   Net -200 ml     Constitutional:   awake, alert, cooperative, no apparent distress, and appears stated age     Eyes:   Lids and lashes normal, pupils equal, round and reactive to light, extra ocular muscles intact, sclera clear, conjunctiva normal     Neck:   supple, symmetrical, trachea midline,      Back:   symmetric     Lungs:   Clear     Cardiovascular:   Irreg irreg     Abdomen:   non-tender     Musculoskeletal:   motor strength is 5 out of 5 all extremities bilaterally     Neurologic:   Grossly nonfocal     Skin:   normal skin color, texture, turgor     Additional findings:                    Past Medical History:   I have reviewed this patient's past medical " history  Atrial fibrillation, hypertension          Past Surgical History:   I have reviewed this patient's past surgical history  Reviewed and noncontributory            Social History:   I have reviewed this patient's social history  Reviewed and noncontributory             Family History:   I have reviewed this patient's family history  and noncontributory to current presentation          Allergies:   No Known Allergies          Medications:   I have reviewed this patient's current medications  Medications Prior to Admission   Medication Sig Dispense Refill Last Dose    amLODIPine (NORVASC) 10 MG tablet Take 10 mg by mouth daily   8/7/2023 at AM    aspirin (ASA) 325 MG EC tablet Take 325 mg by mouth daily as needed for pain   Unknown at PRN    CALCIUM PO Take 1 tablet by mouth daily   8/7/2023    multivitamin, therapeutic (THERA-VIT) TABS tablet Take 1 tablet by mouth daily   8/7/2023    SELENIUM PO Take 1 tablet by mouth daily   8/7/2023    vitamin B-12 (CYANOCOBALAMIN) 2500 MCG sublingual tablet Take 2,500 mcg by mouth daily   8/7/2023    vitamin C with B complex (B COMPLEX-C) tablet Take 1 tablet by mouth daily   8/7/2023    VITAMIN D PO Take 1 tablet by mouth daily   8/7/2023    zinc gluconate 50 MG tablet Take 50 mg by mouth daily   8/7/2023    atenolol (TENORMIN) 50 MG tablet Take 50 mg by mouth 2 times daily        Current Facility-Administered Medications Ordered in Epic   Medication Dose Route Frequency Last Rate Last Admin    acetaminophen (TYLENOL) tablet 650 mg  650 mg Oral Q6H PRN        Or    acetaminophen (TYLENOL) Suppository 650 mg  650 mg Rectal Q6H PRN        amLODIPine (NORVASC) tablet 10 mg  10 mg Oral Daily        aspirin (ASA) EC tablet 325 mg  325 mg Oral Daily   325 mg at 08/08/23 0946    Or    aspirin (ASA) chewable tablet 324 mg  324 mg Per Feeding Tube Daily        Medication Instructions - Avoid dextrose in IV solutions.   Intravenous Continuous PRN        melatonin tablet 1 mg  1 mg  Oral At Bedtime PRN   1 mg at 08/08/23 0004    [START ON 8/9/2023] metoprolol tartrate (LOPRESSOR) tablet 25 mg  25 mg Oral BID        ondansetron (ZOFRAN ODT) ODT tab 4 mg  4 mg Oral Q6H PRN        Or    ondansetron (ZOFRAN) injection 4 mg  4 mg Intravenous Q6H PRN        prochlorperazine (COMPAZINE) injection 5 mg  5 mg Intravenous Q6H PRN        Or    prochlorperazine (COMPAZINE) tablet 5 mg  5 mg Oral Q6H PRN        Or    prochlorperazine (COMPAZINE) suppository 12.5 mg  12.5 mg Rectal Q12H PRN        senna-docusate (SENOKOT-S/PERICOLACE) 8.6-50 MG per tablet 1 tablet  1 tablet Oral or Feeding Tube BID PRN        Or    senna-docusate (SENOKOT-S/PERICOLACE) 8.6-50 MG per tablet 2 tablet  2 tablet Oral or Feeding Tube BID PRN         No current [facility]-ordered outpatient medications on file.             Review of Systems:     The 10 point Review of Systems is negative other than noted in the HPI            Data:   All laboratory data reviewed  Results for orders placed or performed during the hospital encounter of 08/07/23 (from the past 24 hour(s))   CBC (+ platelets, no diff)   Result Value Ref Range    WBC Count 6.0 4.0 - 11.0 10e3/uL    RBC Count 5.13 4.40 - 5.90 10e6/uL    Hemoglobin 15.0 13.3 - 17.7 g/dL    Hematocrit 45.9 40.0 - 53.0 %    MCV 90 78 - 100 fL    MCH 29.2 26.5 - 33.0 pg    MCHC 32.7 31.5 - 36.5 g/dL    RDW 14.2 10.0 - 15.0 %    Platelet Count 213 150 - 450 10e3/uL   Basic metabolic panel   Result Value Ref Range    Sodium 140 136 - 145 mmol/L    Potassium 4.3 3.4 - 5.3 mmol/L    Chloride 104 98 - 107 mmol/L    Carbon Dioxide (CO2) 25 22 - 29 mmol/L    Anion Gap 11 7 - 15 mmol/L    Urea Nitrogen 14.6 8.0 - 23.0 mg/dL    Creatinine 1.36 (H) 0.67 - 1.17 mg/dL    Calcium 9.1 8.8 - 10.2 mg/dL    Glucose 103 (H) 70 - 99 mg/dL    GFR Estimate 56 (L) >60 mL/min/1.73m2   Troponin T, High Sensitivity   Result Value Ref Range    Troponin T, High Sensitivity 7 <=22 ng/L   [place] Draw    Narrative    The  following orders were created for panel order [place] Draw.  Procedure                               Abnormality         Status                     ---------                               -----------         ------                     Extra Blue Top Tube[[id number]]                              Final result                 Please view results for these tests on the individual orders.   Extra Blue Top Tube   Result Value Ref Range    Hold Specimen JIC    BNP   Result Value Ref Range    N terminal Pro BNP Inpatient 1,217 (H) 0 - 900 pg/mL   TSH with free T4 reflex   Result Value Ref Range    TSH 5.16 (H) 0.30 - 4.20 uIU/mL   Magnesium   Result Value Ref Range    Magnesium 1.8 1.7 - 2.3 mg/dL   T4 free   Result Value Ref Range    Free T4 1.25 0.90 - 1.70 ng/dL   Hemoglobin A1c   Result Value Ref Range    Hemoglobin A1C 5.9 (H) <5.7 %   EKG 12-lead, tracing only   Result Value Ref Range    Systolic Blood Pressure  mmHg    Diastolic Blood Pressure  mmHg    Ventricular Rate 59 BPM    Atrial Rate  BPM    DC Interval  ms    QRS Duration 104 ms     ms    QTc 429 ms    P Axis  degrees    R AXIS 50 degrees    T Axis 208 degrees    Interpretation ECG       Atrial fibrillation with slow ventricular response  ST & T wave abnormality, consider inferior ischemia  Abnormal ECG  No previous ECGs available  Confirmed by GENERATED REPORT, COMPUTER ([id number]),  Aasen, Bradley ([id number]) on 8/7/2023 3:09:57 PM     Troponin T, High Sensitivity (now)   Result Value Ref Range    Troponin T, High Sensitivity 7 <=22 ng/L   Lipid panel reflex to direct LDL: Non-fasting   Result Value Ref Range    Cholesterol 155 <200 mg/dL    Triglycerides 45 <150 mg/dL    Direct Measure HDL 49 >=40 mg/dL    LDL Cholesterol Calculated 97 <=100 mg/dL    Non HDL Cholesterol 106 <130 mg/dL    Narrative    Cholesterol  Desirable:  <200 mg/dL    Triglycerides  Normal:  Less than 150 mg/dL  Borderline High:  150-199 mg/dL  High:  200-499 mg/dL  Very High:   Greater than or equal to 500 mg/dL    Direct Measure HDL  Female:  Greater than or equal to 50 mg/dL   Male:  Greater than or equal to 40 mg/dL    LDL Cholesterol  Desirable:  <100mg/dL  Above Desirable:  100-129 mg/dL   Borderline High:  130-159 mg/dL   High:  160-189 mg/dL   Very High:  >= 190 mg/dL    Non HDL Cholesterol  Desirable:  130 mg/dL  Above Desirable:  130-159 mg/dL  Borderline High:  160-189 mg/dL  High:  190-219 mg/dL  Very High:  Greater than or equal to 220 mg/dL   UA with Microscopic reflex to Culture    Specimen: Urine, Clean Catch   Result Value Ref Range    Color Urine Yellow Colorless, Straw, Light Yellow, Yellow    Appearance Urine Clear Clear    Glucose Urine Negative Negative mg/dL    Bilirubin Urine Negative Negative    Ketones Urine Negative Negative mg/dL    Specific Gravity Urine 1.017 1.003 - 1.035    Blood Urine Negative Negative    pH Urine 5.5 5.0 - 7.0    Protein Albumin Urine 10 (A) Negative mg/dL    Urobilinogen Urine Normal Normal, 2.0 mg/dL    Nitrite Urine Negative Negative    Leukocyte Esterase Urine Negative Negative    Mucus Urine Present (A) None Seen /LPF    RBC Urine 1 <=2 /HPF    WBC Urine 1 <=5 /HPF    Hyaline Casts Urine 1 <=2 /LPF    Narrative    Urine Culture not indicated   MRA Angiogram Head w/o Contrast    Narrative    EXAM: MR BRAIN W/O CONTRAST, MRA NECK (CAROTIDS) W/O CONTRAST, MRA BRAIN ([place] [facility] [place]) W/O CONTRAST  LOCATION: [facility]  DATE: 8/7/2023    INDICATION: Sudden onset dizziness, A-fib not on anticoagulant, eval for posterior circulation CVA  COMPARISON: None.  TECHNIQUE:   1) Multiplanar multisequence head MRI without and with intravenous contrast, including sagittal T1, axial DWI, axial T2 fat-sat and axial T2 FLAIR fat-sat sequences. Of note, the patient was unable to tolerate/declined to continue the remainder of the   planned study and no susceptibility-sensitive sequences were able to be obtained.  2) 3D  time-of-flight head MRA without intravenous contrast.  3) 2-D time-of-flight neck MRA without contrast. Stenosis measurements made according to NASCET criteria unless otherwise specified. Of note, the patient was unable to tolerate/declined to continue the planned postcontrast portions of this MRA neck exam.    FINDINGS:  HEAD MRI:  INTRACRANIAL CONTENTS: No abnormal intracranial restricted diffusion is identified to suggest recent infarct. There are multiple small chronic infarcts involving the bilateral cerebellar hemispheres. Small area of T2 hyperintense signal in the right   pelvis may represent dilated perivascular spaces or possibly a small chronic infarct. Small area of T2 FLAIR hyperintense signal involving the subcortical white matter of the right parietal lobe (series 801 image 19), with some associated volume loss,   likely representing a small chronic cortical/subcortical infarct. Mild to moderate extent of patchy nonspecific T2 FLAIR hyperintense signal abnormality in the cerebral white matter, likely due to chronic small vessel ischemic disease. Somewhat prominent   dilated perivascular spaces in the bilateral basal ganglia regions. There is mild generalized brain parenchymal volume loss. There is mild presumed ex vacuo dilatation of the supratentorial ventricular system. No definite acute intracranial hemorrhage   identified, accounting for limitations of technique. No extra-axial fluid collection, contour deforming mass lesion, or significant mass effect/herniation.    SELLA: No abnormality accounting for technique.    OSSEOUS STRUCTURES/SOFT TISSUES: Mildly heterogeneous nonspecific marrow signal change in the upper cervical spine with overall probable benign pattern. The calvarial/skull base and facial marrow signal appears unremarkable. The major intracranial   vascular flow voids are maintained.     ORBITS: No abnormality accounting for technique.     SINUSES/MASTOIDS: Mild mucosal thickening  seen primarily in the ethmoid sinuses. Small retention cyst or polyp in the left maxillary sinus. No middle ear or mastoid effusion.     HEAD MRA:   ANTERIOR CIRCULATION: There is mild luminal irregularity involving the right greater than left carotid siphons without significant stenosis. No high-grade stenosis involving the proximal branches of the anterior cerebral and middle cerebral arteries is   noted.    POSTERIOR CIRCULATION: There appears to be at least moderate focal stenosis of the distal P2/proximal P3 segment of the left posterior cerebral artery (series 505 image 7). Otherwise, mild scattered stenoses involving the bilateral posterior cerebral   arteries that may be due to atherosclerosis. The bilateral vertebral arteries and the basilar artery are patent. No aneurysm or high flow vascular malformation is seen.    NECK MRA:   Evaluation is somewhat limited due to artifact. There are apparent areas of focal signal dropout along the posteromedial aspects of both proximal internal carotid arteries that are most likely due to artifact (series 2 images 24-25). Otherwise, the   bilateral common carotid arteries and cervical internal carotid arteries appear to be patent where visualized without definite flow-limiting stenosis. Expected areas of segmental signal dropout in the V3 segments of both vertebral arteries is most likely   artifactual. Otherwise, the bilateral cervical vertebral arteries appear to be patent with the exception of a focal area of nonspecific moderate stenosis involving the mid V2 segment of the left vertebral artery (series 700 image 22).    Bovine origin left common carotid artery. No significant stenosis at the origin of the great vessels.      Impression    IMPRESSION:  HEAD MRI:   1.  Evaluation mildly limited due to patient unable to tolerate/declined to complete the entire planned/protocoled exam. No definite acute intracranial process. Specifically, no findings of acute/early  subacute infarct.  2.  Multiple chronic cerebellar infarcts.  3.  Small chronic cortical/subcortical right parietal infarct.  4.  Brain atrophy and presumed chronic small vessel ischemic changes.    HEAD MRA:   1.  Moderate focal stenosis of the distal P2/proximal P3 segment of the left posterior cerebral artery.  2.  Otherwise, mild scattered areas of luminal irregularity/narrowing involving the anterior and posterior circulation, as described. This may be due to atherosclerosis.  3.  No intracranial aneurysm or high flow vascular malformation.    NECK MRA:  1.  Evaluation somewhat limited due to patient unable to tolerate/declined to complete the entire planned/protocoled exam. Evaluation limited by noncontrast technique. Only noncontrast 2-D time-of-flight images were able to be obtained through the neck.  2.  Focal areas of signal dropout along the posteromedial aspects of both internal carotid arteries are likely due to artifact. No definite flow-limiting stenosis of the internal carotid arteries.  3.  There appears to be up to moderate nonspecific focal stenosis of the V2 segment of the left vertebral artery.  4.  CTA could be considered for further evaluation if clinically warranted.   MR Brain w/o Contrast    Narrative    EXAM: MR BRAIN W/O CONTRAST, MRA NECK (CAROTIDS) W/O CONTRAST, MRA BRAIN ([place]) W/O CONTRAST  LOCATION: [facility]  DATE: 8/7/2023    INDICATION: Sudden onset dizziness, A-fib not on anticoagulant, eval for posterior circulation CVA  COMPARISON: None.  TECHNIQUE:   1) Multiplanar multisequence head MRI without and with intravenous contrast, including sagittal T1, axial DWI, axial T2 fat-sat and axial T2 FLAIR fat-sat sequences. Of note, the patient was unable to tolerate/declined to continue the remainder of the   planned study and no susceptibility-sensitive sequences were able to be obtained.  2) 3D time-of-flight head MRA without intravenous  contrast.  3) 2-D time-of-flight neck MRA without contrast. Stenosis measurements made according to NASCET criteria unless otherwise specified. Of note, the patient was unable to tolerate/declined to continue the planned postcontrast portions of this MRA neck exam.    FINDINGS:  HEAD MRI:  INTRACRANIAL CONTENTS: No abnormal intracranial restricted diffusion is identified to suggest recent infarct. There are multiple small chronic infarcts involving the bilateral cerebellar hemispheres. Small area of T2 hyperintense signal in the right   pelvis may represent dilated perivascular spaces or possibly a small chronic infarct. Small area of T2 FLAIR hyperintense signal involving the subcortical white matter of the right parietal lobe (series 801 image 19), with some associated volume loss,   likely representing a small chronic cortical/subcortical infarct. Mild to moderate extent of patchy nonspecific T2 FLAIR hyperintense signal abnormality in the cerebral white matter, likely due to chronic small vessel ischemic disease. Somewhat prominent   dilated perivascular spaces in the bilateral basal ganglia regions. There is mild generalized brain parenchymal volume loss. There is mild presumed ex vacuo dilatation of the supratentorial ventricular system. No definite acute intracranial hemorrhage   identified, accounting for limitations of technique. No extra-axial fluid collection, contour deforming mass lesion, or significant mass effect/herniation.    SELLA: No abnormality accounting for technique.    OSSEOUS STRUCTURES/SOFT TISSUES: Mildly heterogeneous nonspecific marrow signal change in the upper cervical spine with overall probable benign pattern. The calvarial/skull base and facial marrow signal appears unremarkable. The major intracranial   vascular flow voids are maintained.     ORBITS: No abnormality accounting for technique.     SINUSES/MASTOIDS: Mild mucosal thickening seen primarily in the ethmoid sinuses. Small  retention cyst or polyp in the left maxillary sinus. No middle ear or mastoid effusion.     HEAD MRA:   ANTERIOR CIRCULATION: There is mild luminal irregularity involving the right greater than left carotid siphons without significant stenosis. No high-grade stenosis involving the proximal branches of the anterior cerebral and middle cerebral arteries is   noted.    POSTERIOR CIRCULATION: There appears to be at least moderate focal stenosis of the distal P2/proximal P3 segment of the left posterior cerebral artery (series 505 image 7). Otherwise, mild scattered stenoses involving the bilateral posterior cerebral   arteries that may be due to atherosclerosis. The bilateral vertebral arteries and the basilar artery are patent. No aneurysm or high flow vascular malformation is seen.    NECK MRA:   Evaluation is somewhat limited due to artifact. There are apparent areas of focal signal dropout along the posteromedial aspects of both proximal internal carotid arteries that are most likely due to artifact (series 2 images 24-25). Otherwise, the   bilateral common carotid arteries and cervical internal carotid arteries appear to be patent where visualized without definite flow-limiting stenosis. Expected areas of segmental signal dropout in the V3 segments of both vertebral arteries is most likely   artifactual. Otherwise, the bilateral cervical vertebral arteries appear to be patent with the exception of a focal area of nonspecific moderate stenosis involving the mid V2 segment of the left vertebral artery (series 700 image 22).    Bovine origin left common carotid artery. No significant stenosis at the origin of the great vessels.      Impression    IMPRESSION:  HEAD MRI:   1.  Evaluation mildly limited due to patient unable to tolerate/declined to complete the entire planned/protocoled exam. No definite acute intracranial process. Specifically, no findings of acute/early subacute infarct.  2.  Multiple chronic  cerebellar infarcts.  3.  Small chronic cortical/subcortical right parietal infarct.  4.  Brain atrophy and presumed chronic small vessel ischemic changes.    HEAD MRA:   1.  Moderate focal stenosis of the distal P2/proximal P3 segment of the left posterior cerebral artery.  2.  Otherwise, mild scattered areas of luminal irregularity/narrowing involving the anterior and posterior circulation, as described. This may be due to atherosclerosis.  3.  No intracranial aneurysm or high flow vascular malformation.    NECK MRA:  1.  Evaluation somewhat limited due to patient unable to tolerate/declined to complete the entire planned/protocoled exam. Evaluation limited by noncontrast technique. Only noncontrast 2-D time-of-flight images were able to be obtained through the neck.  2.  Focal areas of signal dropout along the posteromedial aspects of both internal carotid arteries are likely due to artifact. No definite flow-limiting stenosis of the internal carotid arteries.  3.  There appears to be up to moderate nonspecific focal stenosis of the V2 segment of the left vertebral artery.  4.  CTA could be considered for further evaluation if clinically warranted.   MRA Angiogram Neck w/o Contrast    Narrative    EXAM: MR BRAIN W/O CONTRAST, MRA NECK (CAROTIDS) W/O CONTRAST, MRA BRAIN ([place] [facility] [place]) W/O CONTRAST  LOCATION: [facility]  DATE: 8/7/2023    INDICATION: Sudden onset dizziness, A-fib not on anticoagulant, eval for posterior circulation CVA  COMPARISON: None.  TECHNIQUE:   1) Multiplanar multisequence head MRI without and with intravenous contrast, including sagittal T1, axial DWI, axial T2 fat-sat and axial T2 FLAIR fat-sat sequences. Of note, the patient was unable to tolerate/declined to continue the remainder of the   planned study and no susceptibility-sensitive sequences were able to be obtained.  2) 3D time-of-flight head MRA without intravenous contrast.  3) 2-D time-of-flight neck  MRA without contrast. Stenosis measurements made according to NASCET criteria unless otherwise specified. Of note, the patient was unable to tolerate/declined to continue the planned postcontrast portions of this MRA neck exam.    FINDINGS:  HEAD MRI:  INTRACRANIAL CONTENTS: No abnormal intracranial restricted diffusion is identified to suggest recent infarct. There are multiple small chronic infarcts involving the bilateral cerebellar hemispheres. Small area of T2 hyperintense signal in the right   pelvis may represent dilated perivascular spaces or possibly a small chronic infarct. Small area of T2 FLAIR hyperintense signal involving the subcortical white matter of the right parietal lobe (series 801 image 19), with some associated volume loss,   likely representing a small chronic cortical/subcortical infarct. Mild to moderate extent of patchy nonspecific T2 FLAIR hyperintense signal abnormality in the cerebral white matter, likely due to chronic small vessel ischemic disease. Somewhat prominent   dilated perivascular spaces in the bilateral basal ganglia regions. There is mild generalized brain parenchymal volume loss. There is mild presumed ex vacuo dilatation of the supratentorial ventricular system. No definite acute intracranial hemorrhage   identified, accounting for limitations of technique. No extra-axial fluid collection, contour deforming mass lesion, or significant mass effect/herniation.    SELLA: No abnormality accounting for technique.    OSSEOUS STRUCTURES/SOFT TISSUES: Mildly heterogeneous nonspecific marrow signal change in the upper cervical spine with overall probable benign pattern. The calvarial/skull base and facial marrow signal appears unremarkable. The major intracranial   vascular flow voids are maintained.     ORBITS: No abnormality accounting for technique.     SINUSES/MASTOIDS: Mild mucosal thickening seen primarily in the ethmoid sinuses. Small retention cyst or polyp in the left  maxillary sinus. No middle ear or mastoid effusion.     HEAD MRA:   ANTERIOR CIRCULATION: There is mild luminal irregularity involving the right greater than left carotid siphons without significant stenosis. No high-grade stenosis involving the proximal branches of the anterior cerebral and middle cerebral arteries is   noted.    POSTERIOR CIRCULATION: There appears to be at least moderate focal stenosis of the distal P2/proximal P3 segment of the left posterior cerebral artery (series 505 image 7). Otherwise, mild scattered stenoses involving the bilateral posterior cerebral   arteries that may be due to atherosclerosis. The bilateral vertebral arteries and the basilar artery are patent. No aneurysm or high flow vascular malformation is seen.    NECK MRA:   Evaluation is somewhat limited due to artifact. There are apparent areas of focal signal dropout along the posteromedial aspects of both proximal internal carotid arteries that are most likely due to artifact (series 2 images 24-25). Otherwise, the   bilateral common carotid arteries and cervical internal carotid arteries appear to be patent where visualized without definite flow-limiting stenosis. Expected areas of segmental signal dropout in the V3 segments of both vertebral arteries is most likely   artifactual. Otherwise, the bilateral cervical vertebral arteries appear to be patent with the exception of a focal area of nonspecific moderate stenosis involving the mid V2 segment of the left vertebral artery (series 700 image 22).    Bovine origin left common carotid artery. No significant stenosis at the origin of the great vessels.      Impression    IMPRESSION:  HEAD MRI:   1.  Evaluation mildly limited due to patient unable to tolerate/declined to complete the entire planned/protocoled exam. No definite acute intracranial process. Specifically, no findings of acute/early subacute infarct.  2.  Multiple chronic cerebellar infarcts.  3.  Small chronic  cortical/subcortical right parietal infarct.  4.  Brain atrophy and presumed chronic small vessel ischemic changes.    HEAD MRA:   1.  Moderate focal stenosis of the distal P2/proximal P3 segment of the left posterior cerebral artery.  2.  Otherwise, mild scattered areas of luminal irregularity/narrowing involving the anterior and posterior circulation, as described. This may be due to atherosclerosis.  3.  No intracranial aneurysm or high flow vascular malformation.    NECK MRA:  1.  Evaluation somewhat limited due to patient unable to tolerate/declined to complete the entire planned/protocoled exam. Evaluation limited by noncontrast technique. Only noncontrast 2-D time-of-flight images were able to be obtained through the neck.  2.  Focal areas of signal dropout along the posteromedial aspects of both internal carotid arteries are likely due to artifact. No definite flow-limiting stenosis of the internal carotid arteries.  3.  There appears to be up to moderate nonspecific focal stenosis of the V2 segment of the left vertebral artery.  4.  CTA could be considered for further evaluation if clinically warranted.   XR Chest 2 Views    Narrative    EXAM: XR CHEST 2 VIEWS  LOCATION: [facility]  DATE: 8/7/2023    INDICATION: Palpitations, AFib.  COMPARISON: None available.      Impression    IMPRESSION: PA and lateral views of the chest were obtained. Cardiomediastinal silhouette is within normal limits. Asymmetric elevation of the left diaphragm as compared to the right. No suspicious focal pulmonary opacities. No significant pleural   effusion or pneumothorax.   Troponin T, High Sensitivity   Result Value Ref Range    Troponin T, High Sensitivity 8 <=22 ng/L   Basic metabolic panel   Result Value Ref Range    Sodium 140 136 - 145 mmol/L    Potassium 4.0 3.4 - 5.3 mmol/L    Chloride 105 98 - 107 mmol/L    Carbon Dioxide (CO2) 23 22 - 29 mmol/L    Anion Gap 12 7 - 15 mmol/L    Urea Nitrogen  "12.7 8.0 - 23.0 mg/dL    Creatinine 1.13 0.67 - 1.17 mg/dL    Calcium 8.8 8.8 - 10.2 mg/dL    Glucose 84 70 - 99 mg/dL    GFR Estimate 70 >60 mL/min/1.73m2   CBC with platelets   Result Value Ref Range    WBC Count 5.6 4.0 - 11.0 10e3/uL    RBC Count 4.88 4.40 - 5.90 10e6/uL    Hemoglobin 14.0 13.3 - 17.7 g/dL    Hematocrit 42.8 40.0 - 53.0 %    MCV 88 78 - 100 fL    MCH 28.7 26.5 - 33.0 pg    MCHC 32.7 31.5 - 36.5 g/dL    RDW 14.1 10.0 - 15.0 %    Platelet Count 200 150 - 450 10e3/uL   Magnesium   Result Value Ref Range    Magnesium 1.6 (L) 1.7 - 2.3 mg/dL   Phosphorus   Result Value Ref Range    Phosphorus 3.4 2.5 - 4.5 mg/dL   Glucose by meter   Result Value Ref Range    GLUCOSE BY METER POCT 84 70 - 99 mg/dL       Lab Results   Component Value Date    CHOL 155 08/07/2023     Lab Results   Component Value Date    HDL 49 08/07/2023     Lab Results   Component Value Date    LDL 97 08/07/2023     Lab Results   Component Value Date    TRIG 45 08/07/2023     No results found for: CHOLHDLRATIO  TSH   Date Value Ref Range Status   08/07/2023 5.16 (H) 0.30 - 4.20 uIU/mL Final         EKG results:    Echocardiology:    Cardiac stress testing:    Cardiac angiography:    Clinically Significant Risk Factors Present on Admission            # Hypomagnesemia: Lowest Mg = 1.6 mg/dL in last 2 days, will replace as needed     # Drug Induced Platelet Defect: home medication list includes an antiplatelet medication        # Overweight: Estimated body mass index is 28.83 kg/m  as calculated from the following:    Height as of this encounter: 1.702 m (5' 7\").    Weight as of this encounter: 83.5 kg (184 lb 1.6 oz).           Cardiac Arrhythmia: Atrial fibrillation: Chronic    "

## 2023-08-08 NOTE — PLAN OF CARE
Physical Therapy Discharge Summary    Reason for therapy discharge:    All goals and outcomes met, no further needs identified.    Progress towards therapy goal(s). See goals on Care Plan in [facility] electronic health record for goal details.  Goals met    Therapy recommendation(s):    No further therapy is recommended.Pt at or near baseline mobility. Lives in cousins apartment. Stairs to complete. Independent at baseline w/o AD. Works currently in the food industry. Currently independent w/ mobility. Anticipate when medically ready can DC home.  PT Brief overview of current status: independent w/o AD    Recommendation above provided by last treating therapist.

## 2023-08-08 NOTE — PLAN OF CARE
Summary:    Care Plan Summary Note:  Orientation: A&OX4  Observation Goals (met & not met): Not Met  Activity Level: SBA/IND  Fall Risk: N  Behavior & Aggression Tool Color: Green  Pain Management: Denies  ABNL VS/O2: VSS/RA  ABNL Lab/BG: See Chart  Diet: Pending for Dysphagia screen  Bowel/Bladder: continent  Drains/Devices: PIV/SL  Tests/Procedures for next shift: Echo, Neuro Consult, Dysphagia screen, SW,   Anticipated DC date: TBD                           Observation goals  PRIOR TO DISCHARGE       Comments: -diagnostic tests and consults completed and resulted- Not Met  -vital signs normal or at patient baseline- Met  -returns to baseline functional status- Not Met  Nurse to notify provider when observation goals have been met and patient is ready for discharge.

## 2023-08-08 NOTE — PROGRESS NOTES
Observation goals  PRIOR TO DISCHARGE       Comments: -diagnostic tests and consults completed and resulted- Not Met  -vital signs normal or at patient baseline- Met  -returns to baseline functional status- Not Met  Nurse to notify provider when observation goals have been met and patient is ready for discharge.

## 2023-08-08 NOTE — PROGRESS NOTES
[facility]    Medicine Progress Note - Hospitalist Service    Date of Admission:  8/7/2023    Assessment & Plan    Rajeev España is a 67 year old male with PMHx of atrial fibrillation, hypertension. Patient presented to [facility] via ambulance due to concerns of palpitations, lightheadedness and dizziness while at work.  Found to be in A-fib and MRI showing multiple chronic strokes.  He was admitted for further evaluation.      #Multiple chronic cerebral infarcts  #Small or chronic cortical/subcortical right parietal infarct  #New syncopal event  #Palpitations  Possible TIA versus orthostasis versus A-fib with RVR leading to presentation.  Patient denies known history of prior stroke.  However, imaging on arrival showed multiple chronic cerebellar infarcts (see MRI/MRA reports) and chronic right parietal infarct. Pt has no gross neurologic deficits.  As below, patient reports taking aspirin 325 mg at home and not taking Eliquis.  - Neurology consulted, case discussed this afternoon and input is appreciated   - Infarcts appear to be chronic   - Agree with starting Eliquis 5 mg BID for afib  - Start Atorvastatin 20 mg daily   - No indication to continue Aspirin   - recommend follow up with PCP in the next week. No need to follow up with neurology as strokes are chronic in nature.   - SLP - passed bedside swallow, no further need for SLP therapies   - PT recommending discharge to home   - Plan for discharge tomorrow - see discharge planning/housing insecurity below    #Atrial fibrillation, chronic  #Hypertension  Patient reports knowing he has had A-fib for multiple years, although we do not have available records for this.  Previously told to be on Eliquis but then he reports his PCP advised him to stop Eliquis and start  mg daily.  This morning, EKG showed A-fib with bradycardia and multiple 2-3 beat pauses.  Patient asymptomatic during this.  -Echocardiogram today with normal LV  "function 60-65%, no significant valvular disease   - Appreciate input from cardiology.  - Stop atenolol  - Start metoprolol 25 mg twice daily tomorrow  - Discharge on 30-day event monitor (ordered by cardiology)   - We will continue amlodipine 10 mg daily  - Restart Eliquis 5 mg BID   - discontinue Aspirin   - Follow-up in cardiology clinic in 1 month    # Elevated TSH   TSH 5.16 with normal T4. Possible stress response   - Recommend repeating as outpatient in 4 weeks    #Dehydration/mild creatinine elevation -Creatinine 1.36 on admission but normalized today with rehydration.    #History of colon cancer, per patient  Patient reports history of colon \"tumor\" that was removed in 2015.  Abdominal scar noted.    # Discharge Planning  # Housing insecurity   Was brought to my attention this afternoon that patient has unstable housing situation.  I discussed this with patient.  Per patient, he currently resides in an apartment that is rented by his cousin and has been there for several months.  His cousin is currently out of state ([place]) but this cousing is returning home shortly and thus the patient will be left without a place to live. The pt reports he is on a fixed income with penitentiary and social security income. He has been actively searching for a new apartment but due to rising rent costs he has been unable to find a place he can afford and he is worried that he will end up on the streets. He is requesting social work assistance with finding a place to live.  - Social work consult placed                Diet: Low Saturated Fat Na <2400 mg    DVT Prophylaxis: Restarting Eliquis  Walton Catheter: Not present  Lines: None     Cardiac Monitoring: ACTIVE order. Indication: Stroke, acute (48 hours)  Code Status: Full Code      Disposition Plan      Expected Discharge Date: 08/09/2023        Discharge Comments: Neurology   Cardiology.    Echo.        The patient's care was discussed with the Attending Physician,  " "Abel, Bedside Nurse, Care Coordinator/, Patient, and cardiology, neurology Consultant(s).    Gustabo Payton PA-C  Hospitalist Service  [facility]  Securely message with [employer] (more info)  Text page via [employer] Paging/Directory     Clinically Significant Risk Factors Present on Admission            # Hypomagnesemia: Lowest Mg = 1.6 mg/dL in last 2 days, will replace as needed     # Drug Induced Platelet Defect: home medication list includes an antiplatelet medication        # Overweight: Estimated body mass index is 28.83 kg/m  as calculated from the following:    Height as of this encounter: 1.702 m (5' 7\").    Weight as of this encounter: 83.5 kg (184 lb 1.6 oz).            ______________________________________________________________________    Interval History   Patient reports overall doing well today.  Denies further lightheadedness, dizziness, or palpitations.  He denies hemiparesis or change in speech.  He denies any significant facial droop.  Discussed pauses on EKG this morning and he reports feeling asymptomatic during this.    Data reviewed today: I reviewed all medications, new labs and imaging results over the last 24 hours. Please see discussion of these results in the A/P.    Physical Exam    Vital Signs: Temp: 97.2  F (36.2  C) Temp src: Oral BP: 130/79 Pulse: 76     Resp: 16 SpO2: 100 % O2 Device: None (Room air)    Weight: 184 lbs 1.6 oz    Constitutional: awake, alert, cooperative, in no acute distress. A&Ox3  Eyes: EOM, PERRLA. Sclera clear, conjunctiva normal. Anicteric   HENT: Normocephalic, without obvious abnormality, atraumatic.   Respiratory: No increased work of breathing. CTAB  Cards: Irregular rhythm, bradycardic. No appreciable murmur   GI: Soft, non-tender without rebound or guarding.  Hypertrophic, old midline abdominal scar.  Musculoskeletal:  Full range of motion noted.    Neurologic: Cranial nerves II-XII are grossly intact. "   Neuropsychiatric: General: normal, calm and normal eye contact. Normal affect        Data   Recent Labs   Lab 08/08/23  0817 08/08/23  0646 08/07/23  1330   WBC  --  5.6 6.0   HGB  --  14.0 15.0   MCV  --  88 90   PLT  --  200 213   NA  --  140 140   POTASSIUM  --  4.0 4.3   CHLORIDE  --  105 104   CO2  --  23 25   BUN  --  12.7 14.6   CR  --  1.13 1.36*   ANIONGAP  --  12 11   DALLAS  --  8.8 9.1   GLC 84 84 103*       Recent Results (from the past 24 hour(s))   MRA Angiogram Head w/o Contrast    Narrative    EXAM: MR BRAIN W/O CONTRAST, MRA NECK (CAROTIDS) W/O CONTRAST, MRA BRAIN ([place]) W/O CONTRAST  LOCATION: [facility]  DATE: 8/7/2023    INDICATION: Sudden onset dizziness, A-fib not on anticoagulant, eval for posterior circulation CVA  COMPARISON: None.  TECHNIQUE:   1) Multiplanar multisequence head MRI without and with intravenous contrast, including sagittal T1, axial DWI, axial T2 fat-sat and axial T2 FLAIR fat-sat sequences. Of note, the patient was unable to tolerate/declined to continue the remainder of the   planned study and no susceptibility-sensitive sequences were able to be obtained.  2) 3D time-of-flight head MRA without intravenous contrast.  3) 2-D time-of-flight neck MRA without contrast. Stenosis measurements made according to NASCET criteria unless otherwise specified. Of note, the patient was unable to tolerate/declined to continue the planned postcontrast portions of this MRA neck exam.    FINDINGS:  HEAD MRI:  INTRACRANIAL CONTENTS: No abnormal intracranial restricted diffusion is identified to suggest recent infarct. There are multiple small chronic infarcts involving the bilateral cerebellar hemispheres. Small area of T2 hyperintense signal in the right   pelvis may represent dilated perivascular spaces or possibly a small chronic infarct. Small area of T2 FLAIR hyperintense signal involving the subcortical white matter of the right parietal lobe  (series 801 image 19), with some associated volume loss,   likely representing a small chronic cortical/subcortical infarct. Mild to moderate extent of patchy nonspecific T2 FLAIR hyperintense signal abnormality in the cerebral white matter, likely due to chronic small vessel ischemic disease. Somewhat prominent   dilated perivascular spaces in the bilateral basal ganglia regions. There is mild generalized brain parenchymal volume loss. There is mild presumed ex vacuo dilatation of the supratentorial ventricular system. No definite acute intracranial hemorrhage   identified, accounting for limitations of technique. No extra-axial fluid collection, contour deforming mass lesion, or significant mass effect/herniation.    SELLA: No abnormality accounting for technique.    OSSEOUS STRUCTURES/SOFT TISSUES: Mildly heterogeneous nonspecific marrow signal change in the upper cervical spine with overall probable benign pattern. The calvarial/skull base and facial marrow signal appears unremarkable. The major intracranial   vascular flow voids are maintained.     ORBITS: No abnormality accounting for technique.     SINUSES/MASTOIDS: Mild mucosal thickening seen primarily in the ethmoid sinuses. Small retention cyst or polyp in the left maxillary sinus. No middle ear or mastoid effusion.     HEAD MRA:   ANTERIOR CIRCULATION: There is mild luminal irregularity involving the right greater than left carotid siphons without significant stenosis. No high-grade stenosis involving the proximal branches of the anterior cerebral and middle cerebral arteries is   noted.    POSTERIOR CIRCULATION: There appears to be at least moderate focal stenosis of the distal P2/proximal P3 segment of the left posterior cerebral artery (series 505 image 7). Otherwise, mild scattered stenoses involving the bilateral posterior cerebral   arteries that may be due to atherosclerosis. The bilateral vertebral arteries and the basilar artery are patent. No  aneurysm or high flow vascular malformation is seen.    NECK MRA:   Evaluation is somewhat limited due to artifact. There are apparent areas of focal signal dropout along the posteromedial aspects of both proximal internal carotid arteries that are most likely due to artifact (series 2 images 24-25). Otherwise, the   bilateral common carotid arteries and cervical internal carotid arteries appear to be patent where visualized without definite flow-limiting stenosis. Expected areas of segmental signal dropout in the V3 segments of both vertebral arteries is most likely   artifactual. Otherwise, the bilateral cervical vertebral arteries appear to be patent with the exception of a focal area of nonspecific moderate stenosis involving the mid V2 segment of the left vertebral artery (series 700 image 22).    Bovine origin left common carotid artery. No significant stenosis at the origin of the great vessels.      Impression    IMPRESSION:  HEAD MRI:   1.  Evaluation mildly limited due to patient unable to tolerate/declined to complete the entire planned/protocoled exam. No definite acute intracranial process. Specifically, no findings of acute/early subacute infarct.  2.  Multiple chronic cerebellar infarcts.  3.  Small chronic cortical/subcortical right parietal infarct.  4.  Brain atrophy and presumed chronic small vessel ischemic changes.    HEAD MRA:   1.  Moderate focal stenosis of the distal P2/proximal P3 segment of the left posterior cerebral artery.  2.  Otherwise, mild scattered areas of luminal irregularity/narrowing involving the anterior and posterior circulation, as described. This may be due to atherosclerosis.  3.  No intracranial aneurysm or high flow vascular malformation.    NECK MRA:  1.  Evaluation somewhat limited due to patient unable to tolerate/declined to complete the entire planned/protocoled exam. Evaluation limited by noncontrast technique. Only noncontrast 2-D time-of-flight images were able  to be obtained through the neck.  2.  Focal areas of signal dropout along the posteromedial aspects of both internal carotid arteries are likely due to artifact. No definite flow-limiting stenosis of the internal carotid arteries.  3.  There appears to be up to moderate nonspecific focal stenosis of the V2 segment of the left vertebral artery.  4.  CTA could be considered for further evaluation if clinically warranted.   MR Brain w/o Contrast    Narrative    EXAM: MR BRAIN W/O CONTRAST, MRA NECK (CAROTIDS) W/O CONTRAST, MRA BRAIN ([place]) W/O CONTRAST  LOCATION: [facility]  DATE: 8/7/2023    INDICATION: Sudden onset dizziness, A-fib not on anticoagulant, eval for posterior circulation CVA  COMPARISON: None.  TECHNIQUE:   1) Multiplanar multisequence head MRI without and with intravenous contrast, including sagittal T1, axial DWI, axial T2 fat-sat and axial T2 FLAIR fat-sat sequences. Of note, the patient was unable to tolerate/declined to continue the remainder of the   planned study and no susceptibility-sensitive sequences were able to be obtained.  2) 3D time-of-flight head MRA without intravenous contrast.  3) 2-D time-of-flight neck MRA without contrast. Stenosis measurements made according to NASCET criteria unless otherwise specified. Of note, the patient was unable to tolerate/declined to continue the planned postcontrast portions of this MRA neck exam.    FINDINGS:  HEAD MRI:  INTRACRANIAL CONTENTS: No abnormal intracranial restricted diffusion is identified to suggest recent infarct. There are multiple small chronic infarcts involving the bilateral cerebellar hemispheres. Small area of T2 hyperintense signal in the right   pelvis may represent dilated perivascular spaces or possibly a small chronic infarct. Small area of T2 FLAIR hyperintense signal involving the subcortical white matter of the right parietal lobe (series 801 image 19), with some associated volume loss,    likely representing a small chronic cortical/subcortical infarct. Mild to moderate extent of patchy nonspecific T2 FLAIR hyperintense signal abnormality in the cerebral white matter, likely due to chronic small vessel ischemic disease. Somewhat prominent   dilated perivascular spaces in the bilateral basal ganglia regions. There is mild generalized brain parenchymal volume loss. There is mild presumed ex vacuo dilatation of the supratentorial ventricular system. No definite acute intracranial hemorrhage   identified, accounting for limitations of technique. No extra-axial fluid collection, contour deforming mass lesion, or significant mass effect/herniation.    SELLA: No abnormality accounting for technique.    OSSEOUS STRUCTURES/SOFT TISSUES: Mildly heterogeneous nonspecific marrow signal change in the upper cervical spine with overall probable benign pattern. The calvarial/skull base and facial marrow signal appears unremarkable. The major intracranial   vascular flow voids are maintained.     ORBITS: No abnormality accounting for technique.     SINUSES/MASTOIDS: Mild mucosal thickening seen primarily in the ethmoid sinuses. Small retention cyst or polyp in the left maxillary sinus. No middle ear or mastoid effusion.     HEAD MRA:   ANTERIOR CIRCULATION: There is mild luminal irregularity involving the right greater than left carotid siphons without significant stenosis. No high-grade stenosis involving the proximal branches of the anterior cerebral and middle cerebral arteries is   noted.    POSTERIOR CIRCULATION: There appears to be at least moderate focal stenosis of the distal P2/proximal P3 segment of the left posterior cerebral artery (series 505 image 7). Otherwise, mild scattered stenoses involving the bilateral posterior cerebral   arteries that may be due to atherosclerosis. The bilateral vertebral arteries and the basilar artery are patent. No aneurysm or high flow vascular malformation is  seen.    NECK MRA:   Evaluation is somewhat limited due to artifact. There are apparent areas of focal signal dropout along the posteromedial aspects of both proximal internal carotid arteries that are most likely due to artifact (series 2 images 24-25). Otherwise, the   bilateral common carotid arteries and cervical internal carotid arteries appear to be patent where visualized without definite flow-limiting stenosis. Expected areas of segmental signal dropout in the V3 segments of both vertebral arteries is most likely   artifactual. Otherwise, the bilateral cervical vertebral arteries appear to be patent with the exception of a focal area of nonspecific moderate stenosis involving the mid V2 segment of the left vertebral artery (series 700 image 22).    Bovine origin left common carotid artery. No significant stenosis at the origin of the great vessels.      Impression    IMPRESSION:  HEAD MRI:   1.  Evaluation mildly limited due to patient unable to tolerate/declined to complete the entire planned/protocoled exam. No definite acute intracranial process. Specifically, no findings of acute/early subacute infarct.  2.  Multiple chronic cerebellar infarcts.  3.  Small chronic cortical/subcortical right parietal infarct.  4.  Brain atrophy and presumed chronic small vessel ischemic changes.    HEAD MRA:   1.  Moderate focal stenosis of the distal P2/proximal P3 segment of the left posterior cerebral artery.  2.  Otherwise, mild scattered areas of luminal irregularity/narrowing involving the anterior and posterior circulation, as described. This may be due to atherosclerosis.  3.  No intracranial aneurysm or high flow vascular malformation.    NECK MRA:  1.  Evaluation somewhat limited due to patient unable to tolerate/declined to complete the entire planned/protocoled exam. Evaluation limited by noncontrast technique. Only noncontrast 2-D time-of-flight images were able to be obtained through the neck.  2.  Focal  areas of signal dropout along the posteromedial aspects of both internal carotid arteries are likely due to artifact. No definite flow-limiting stenosis of the internal carotid arteries.  3.  There appears to be up to moderate nonspecific focal stenosis of the V2 segment of the left vertebral artery.  4.  CTA could be considered for further evaluation if clinically warranted.   MRA Angiogram Neck w/o Contrast    Narrative    EXAM: MR BRAIN W/O CONTRAST, MRA NECK (CAROTIDS) W/O CONTRAST, MRA BRAIN ([place]) W/O CONTRAST  LOCATION: [facility]  DATE: 8/7/2023    INDICATION: Sudden onset dizziness, A-fib not on anticoagulant, eval for posterior circulation CVA  COMPARISON: None.  TECHNIQUE:   1) Multiplanar multisequence head MRI without and with intravenous contrast, including sagittal T1, axial DWI, axial T2 fat-sat and axial T2 FLAIR fat-sat sequences. Of note, the patient was unable to tolerate/declined to continue the remainder of the   planned study and no susceptibility-sensitive sequences were able to be obtained.  2) 3D time-of-flight head MRA without intravenous contrast.  3) 2-D time-of-flight neck MRA without contrast. Stenosis measurements made according to NASCET criteria unless otherwise specified. Of note, the patient was unable to tolerate/declined to continue the planned postcontrast portions of this MRA neck exam.    FINDINGS:  HEAD MRI:  INTRACRANIAL CONTENTS: No abnormal intracranial restricted diffusion is identified to suggest recent infarct. There are multiple small chronic infarcts involving the bilateral cerebellar hemispheres. Small area of T2 hyperintense signal in the right   pelvis may represent dilated perivascular spaces or possibly a small chronic infarct. Small area of T2 FLAIR hyperintense signal involving the subcortical white matter of the right parietal lobe (series 801 image 19), with some associated volume loss,   likely representing a small  chronic cortical/subcortical infarct. Mild to moderate extent of patchy nonspecific T2 FLAIR hyperintense signal abnormality in the cerebral white matter, likely due to chronic small vessel ischemic disease. Somewhat prominent   dilated perivascular spaces in the bilateral basal ganglia regions. There is mild generalized brain parenchymal volume loss. There is mild presumed ex vacuo dilatation of the supratentorial ventricular system. No definite acute intracranial hemorrhage   identified, accounting for limitations of technique. No extra-axial fluid collection, contour deforming mass lesion, or significant mass effect/herniation.    SELLA: No abnormality accounting for technique.    OSSEOUS STRUCTURES/SOFT TISSUES: Mildly heterogeneous nonspecific marrow signal change in the upper cervical spine with overall probable benign pattern. The calvarial/skull base and facial marrow signal appears unremarkable. The major intracranial   vascular flow voids are maintained.     ORBITS: No abnormality accounting for technique.     SINUSES/MASTOIDS: Mild mucosal thickening seen primarily in the ethmoid sinuses. Small retention cyst or polyp in the left maxillary sinus. No middle ear or mastoid effusion.     HEAD MRA:   ANTERIOR CIRCULATION: There is mild luminal irregularity involving the right greater than left carotid siphons without significant stenosis. No high-grade stenosis involving the proximal branches of the anterior cerebral and middle cerebral arteries is   noted.    POSTERIOR CIRCULATION: There appears to be at least moderate focal stenosis of the distal P2/proximal P3 segment of the left posterior cerebral artery (series 505 image 7). Otherwise, mild scattered stenoses involving the bilateral posterior cerebral   arteries that may be due to atherosclerosis. The bilateral vertebral arteries and the basilar artery are patent. No aneurysm or high flow vascular malformation is seen.    NECK MRA:   Evaluation is  somewhat limited due to artifact. There are apparent areas of focal signal dropout along the posteromedial aspects of both proximal internal carotid arteries that are most likely due to artifact (series 2 images 24-25). Otherwise, the   bilateral common carotid arteries and cervical internal carotid arteries appear to be patent where visualized without definite flow-limiting stenosis. Expected areas of segmental signal dropout in the V3 segments of both vertebral arteries is most likely   artifactual. Otherwise, the bilateral cervical vertebral arteries appear to be patent with the exception of a focal area of nonspecific moderate stenosis involving the mid V2 segment of the left vertebral artery (series 700 image 22).    Bovine origin left common carotid artery. No significant stenosis at the origin of the great vessels.      Impression    IMPRESSION:  HEAD MRI:   1.  Evaluation mildly limited due to patient unable to tolerate/declined to complete the entire planned/protocoled exam. No definite acute intracranial process. Specifically, no findings of acute/early subacute infarct.  2.  Multiple chronic cerebellar infarcts.  3.  Small chronic cortical/subcortical right parietal infarct.  4.  Brain atrophy and presumed chronic small vessel ischemic changes.    HEAD MRA:   1.  Moderate focal stenosis of the distal P2/proximal P3 segment of the left posterior cerebral artery.  2.  Otherwise, mild scattered areas of luminal irregularity/narrowing involving the anterior and posterior circulation, as described. This may be due to atherosclerosis.  3.  No intracranial aneurysm or high flow vascular malformation.    NECK MRA:  1.  Evaluation somewhat limited due to patient unable to tolerate/declined to complete the entire planned/protocoled exam. Evaluation limited by noncontrast technique. Only noncontrast 2-D time-of-flight images were able to be obtained through the neck.  2.  Focal areas of signal dropout along the  posteromedial aspects of both internal carotid arteries are likely due to artifact. No definite flow-limiting stenosis of the internal carotid arteries.  3.  There appears to be up to moderate nonspecific focal stenosis of the V2 segment of the left vertebral artery.  4.  CTA could be considered for further evaluation if clinically warranted.   XR Chest 2 Views    Narrative    EXAM: XR CHEST 2 VIEWS  LOCATION: [facility]  DATE: 8/7/2023    INDICATION: Palpitations, AFib.  COMPARISON: None available.      Impression    IMPRESSION: PA and lateral views of the chest were obtained. Cardiomediastinal silhouette is within normal limits. Asymmetric elevation of the left diaphragm as compared to the right. No suspicious focal pulmonary opacities. No significant pleural   effusion or pneumothorax.

## 2023-08-09 ENCOUNTER — APPOINTMENT (OUTPATIENT)
Dept: CARDIOLOGY | Facility: CLINIC | Age: 68
End: 2023-08-09
Attending: INTERNAL MEDICINE
Payer: COMMERCIAL

## 2023-08-09 VITALS
DIASTOLIC BLOOD PRESSURE: 71 MMHG | BODY MASS INDEX: 28.9 KG/M2 | HEIGHT: 67 IN | WEIGHT: 184.1 LBS | HEART RATE: 51 BPM | SYSTOLIC BLOOD PRESSURE: 129 MMHG | OXYGEN SATURATION: 100 % | TEMPERATURE: 97.5 F | RESPIRATION RATE: 18 BRPM

## 2023-08-09 PROCEDURE — 93270 REMOTE 30 DAY ECG REV/REPORT: CPT

## 2023-08-09 PROCEDURE — 250N000013 HC RX MED GY IP 250 OP 250 PS 637: Performed by: STUDENT IN AN ORGANIZED HEALTH CARE EDUCATION/TRAINING PROGRAM

## 2023-08-09 PROCEDURE — G0378 HOSPITAL OBSERVATION PER HR: HCPCS

## 2023-08-09 PROCEDURE — 99231 SBSQ HOSP IP/OBS SF/LOW 25: CPT | Mod: 25 | Performed by: NURSE PRACTITIONER

## 2023-08-09 PROCEDURE — 93272 ECG/REVIEW INTERPRET ONLY: CPT | Performed by: INTERNAL MEDICINE

## 2023-08-09 PROCEDURE — 99221 1ST HOSP IP/OBS SF/LOW 40: CPT | Performed by: NURSE PRACTITIONER

## 2023-08-09 PROCEDURE — 250N000013 HC RX MED GY IP 250 OP 250 PS 637: Performed by: INTERNAL MEDICINE

## 2023-08-09 PROCEDURE — 99239 HOSP IP/OBS DSCHRG MGMT >30: CPT | Performed by: INTERNAL MEDICINE

## 2023-08-09 PROCEDURE — 250N000013 HC RX MED GY IP 250 OP 250 PS 637: Performed by: HOSPITALIST

## 2023-08-09 RX ADMIN — APIXABAN 5 MG: 5 TABLET, FILM COATED ORAL at 09:16

## 2023-08-09 RX ADMIN — METOPROLOL TARTRATE 25 MG: 25 TABLET, FILM COATED ORAL at 09:15

## 2023-08-09 RX ADMIN — AMLODIPINE BESYLATE 10 MG: 10 TABLET ORAL at 09:15

## 2023-08-09 ASSESSMENT — ACTIVITIES OF DAILY LIVING (ADL)
ADLS_ACUITY_SCORE: 33

## 2023-08-09 NOTE — PLAN OF CARE
Summary: Admitted due to concerns of palpitations, lightheadedness and dizziness while at work. Evaluated for possible TIA. Cardiac monitoring. Pt with A-fib uncontrolled at time of admission.     Orientation: A&OX4  Observation Goals (met & not met): Not Met  Activity Level: IND  Fall Risk: N  Behavior & Aggression Tool Color: Green  Pain Management: Denies  ABNL VS/O2: VSS/RA Tele: A-fib CVR  ABNL Lab/BG: See Chart  Diet: 2G NA  Bowel/Bladder: continent  Drains/Devices: PIV/SL  Tests/Procedures for next shift: SW  Anticipated DC date: TBD, Pending SW

## 2023-08-09 NOTE — PLAN OF CARE
Orientation/Cognitive: A&O neuros intact  Observation Goals (Met/ Not Met): met  Mobility Level/Assist Equipment: indep  Fall Risk (Y/N): N  Behavior Concerns: N  Pain Management: denies  Tele/VS/O2: Tele Afib CVR. Chad at times VSS on RA  ABNL Lab/BG: mg  Diet: 2 gm Na  Bowel/Bladder: cont  Skin Concerns: no  Drains/Devices: heart monitor disch  Tests/Procedures for next shift: no  Anticipated DC date & active delays: 8-9  Patient Stated Goal for Today: shower  AVS reviewed. Pt in agreement. Discharged home w family

## 2023-08-09 NOTE — PROGRESS NOTES
Brief cardiology progress note:  Patent getting ready to discharge. Denies any cardiac symptoms overnight. Telemetry reviewed without significant bradycardia, rate primarily in the 60's with atrial fibrillation. Reiterated to nursing patient should be discharged with 30 day event monitor. Patient has [employer] insurance and needs cardiology follow up outside of [place] for insurance coverage. Discussed with care coordination team 8/8 and 8/9 prior to patient's discharge.

## 2023-08-09 NOTE — PROGRESS NOTES
Writer assisted with scheduling cardiology follow up for patient with [facility].  Writer will fax referral and chart notes to [facility] at: [phone].    Follow-up with cardiology as recommended  (in one month) with Dr. Gifford on Tuesday, Aug. 28th at 2:30 PM at:     [facility] ~ [address]   [address]   [place] [address]   [phone]     Anushka Brandon RN Care Coordinator  [facility]  [phone]

## 2023-08-09 NOTE — CONSULTS
"[facility]    Stroke Consult Note    Reason for Consult:  chronic stroke    Chief Complaint: Fatigue and Palpitations       HPI  Rajeev España is a 67 year old male with past medical history significant for atrial fibrillation (on ASA alone), HTN. He presented to the ED 8/7 for evaluation of dizziness and palpitations. MRI brain shows several chronic infarcts, no evidence of acute stroke.      Today on exam, he reports feeling back to baseline. He is not aware of having had a stroke.     Impression  Episode of dizziness and palpitations without report of focal neurologic deficits. MRI brain negative for acute stroke. MRI does show evidence of chronic infarcts in the cerebellum and right parietal lobes.      History of atrial fibrillation, not on anticoagulation.    Recommendations   - Recommend DOAC for secondary stroke prevention (CHADs-VASc 4)  - LDL 97. Given chronic infarcts and multifocal stenosis, recommend starting Lipitor 20 mg daily with goal LDL 40-70  - A1c within goal <7.0  - Goal BP <140/90 with tighter control associated with decreased overall CV risk, if tolerated  - Therapies, as needed    Thank you for this consult. No further stroke evaluation is recommended, so we will sign off. Please contact us with any additional questions.    Yomaira Stallings, CNP  Vascular Neurology    To page me or covering stroke neurology team member, click here: [facility]  Choose \"On Call\" tab at top, then select \"NEUROLOGY/ALL SITES\" from middle drop-down box, press Enter, then look for \"stroke\" or \"telestroke\" for your site.  _____________________________________________________    Clinically Significant Risk Factors Present on Admission            # Hypomagnesemia: Lowest Mg = 1.6 mg/dL in last 2 days, will replace as needed     # Drug Induced Platelet Defect: home medication list includes an antiplatelet medication        # Overweight: Estimated body mass index is 28.83 kg/m  as " "calculated from the following:    Height as of this encounter: 1.702 m (5' 7\").    Weight as of this encounter: 83.5 kg (184 lb 1.6 oz).              Past Medical History    No past medical history on file.  Medications   Home Meds  Prior to Admission medications    Medication Sig Start Date End Date Taking? Authorizing Provider   amLODIPine (NORVASC) 10 MG tablet Take 10 mg by mouth daily   Yes Unknown, Entered By History   apixaban ANTICOAGULANT (ELIQUIS) 5 MG tablet Take 1 tablet (5 mg) by mouth 2 times daily for 30 days 8/8/23 9/7/23 Yes Gustabo Payton PA-C   atorvastatin (LIPITOR) 20 MG tablet Take 1 tablet (20 mg) by mouth every evening for 30 days 8/8/23 9/7/23 Yes Gustabo Payton PA-C   CALCIUM PO Take 1 tablet by mouth daily   Yes Unknown, Entered By History   metoprolol tartrate (LOPRESSOR) 25 MG tablet Take 1 tablet (25 mg) by mouth 2 times daily for 30 days 8/9/23 9/8/23 Yes Gustabo Payton PA-C   multivitamin, therapeutic (THERA-VIT) TABS tablet Take 1 tablet by mouth daily   Yes Unknown, Entered By History   SELENIUM PO Take 1 tablet by mouth daily   Yes Unknown, Entered By History   vitamin B-12 (CYANOCOBALAMIN) 2500 MCG sublingual tablet Take 2,500 mcg by mouth daily   Yes Unknown, Entered By History   vitamin C with B complex (B COMPLEX-C) tablet Take 1 tablet by mouth daily   Yes Unknown, Entered By History   VITAMIN D PO Take 1 tablet by mouth daily   Yes Unknown, Entered By History   zinc gluconate 50 MG tablet Take 50 mg by mouth daily   Yes Unknown, Entered By History       Scheduled Meds      Infusion Meds      Allergies   No Known Allergies       PHYSICAL EXAMINATION   Temp:  [97.3  F (36.3  C)-97.8  F (36.6  C)] 97.5  F (36.4  C)  Pulse:  [51-70] 51  Resp:  [14-18] 18  BP: (121-152)/() 129/71  SpO2:  [98 %-100 %] 100 %    Neurologic  Mental Status:  alert, oriented x 3, follows commands, speech clear and fluent, naming and repetition normal  Cranial Nerves:  visual fields intact, " PERRL, EOMI with normal smooth pursuit, facial sensation intact and symmetric, facial movements symmetric, hearing not formally tested but intact to conversation, no dysarthria, tongue protrusion midline  Motor:  normal muscle tone and bulk, no abnormal movements, able to move all limbs spontaneously, no pronator drift  Reflexes:  toes down-going  Sensory:  light touch sensation intact and symmetric throughout upper and lower extremities  Coordination:   no obvious ataxia  Station/Gait:  deferred        Imaging  I personally reviewed all imaging; relevant findings per HPI.    Labs Data   CBC  Recent Labs   Lab 08/08/23  0646 08/07/23  1330   WBC 5.6 6.0   RBC 4.88 5.13   HGB 14.0 15.0   HCT 42.8 45.9    213     Basic Metabolic Panel   Recent Labs   Lab 08/08/23  2148 08/08/23  0817 08/08/23  0646 08/07/23  1330   NA  --   --  140 140   POTASSIUM  --   --  4.0 4.3   CHLORIDE  --   --  105 104   CO2  --   --  23 25   BUN  --   --  12.7 14.6   CR  --   --  1.13 1.36*   GLC 88 84 84 103*   D[place]AS  --   --  8.8 9.1     Liver Panel  No results for input(s): PROTTOTAL, ALBUMIN, BILITOTAL, ALKPHOS, AST, ALT, BILIDIRECT in the last 168 hours.  INR  No lab results found.        Stroke Consult Data Data   This was a non-emergent, non-telestroke consult.  I have personally spent a total of 35 minutes providing care today, time spent in reviewing medical records and reviewing tests, examining the patient and obtaining history, coordination of care, and discussion with the patient and/or family regarding diagnostic results, prognosis, symptom management, risks and benefits of management options, and development of plan of care. Greater than 50% was spent in counseling and coordination of care.

## 2023-08-09 NOTE — DISCHARGE SUMMARY
"[facility]  Hospitalist Discharge Summary      Date of Admission:  8/7/2023  Date of Discharge:  8/9/2023  Discharging Provider: Laura Mtz MD  Discharge Service: Hospitalist Service    Discharge Diagnoses   Multiple chronic cerebral infarcts.  Small chronic cortical/subcortical right parietal infarct.  New syncopal events, most likely secondary to atrial fibrillation  Palpitations, secondary to atrial fibrillation  Chronic atrial fibrillation.  Essential hypertension.  Elevated TSH with normal T4, stress response.  Dehydration with BLANCHE, resolved.  History of colon cancer, stable.  Housing insecurity    Clinically Significant Risk Factors     # Overweight: Estimated body mass index is 28.83 kg/m  as calculated from the following:    Height as of this encounter: 1.702 m (5' 7\").    Weight as of this encounter: 83.5 kg (184 lb 1.6 oz).       Follow-ups Needed After Discharge   Patient has been a started on Eliquis, aspirin has been stopped, atenolol has also been a stopped and patient has been started on metoprolol.    Unresulted Labs Ordered in the Past 30 Days of this Admission       No orders found from 7/8/2023 to 8/8/2023.            Discharge Disposition   Discharged to home  Condition at discharge: Stable    Hospital Course     Rajeev España is a 67 year old male with PMHx of atrial fibrillation, hypertension. Patient presented to [facility] via ambulance due to concerns of palpitations, lightheadedness and dizziness while at work.  Found to be in A-fib and MRI showing multiple chronic strokes.  He was admitted for further evaluation.  Here are further details regarding his current hospitalization       Multiple chronic cerebral infarcts  Small or chronic cortical/subcortical right parietal infarct  New syncopal event  Palpitations  Possible TIA versus orthostasis versus A-fib with RVR leading to presentation.  Patient denies known history of prior stroke.  However, imaging on arrival " "showed multiple chronic cerebellar infarcts (see MRI/MRA reports) and chronic right parietal infarct. Pt has no gross neurologic deficits.  As below, patient reports taking aspirin 325 mg at home and not taking Eliquis.  -- Neurology consulted, case discussed this afternoon and input is appreciated   -- Infarcts appear to be chronic   -- Agree with starting Eliquis 5 mg BID for afib  -- Start Atorvastatin 20 mg daily   -- No indication to continue Aspirin   -- recommend follow up with PCP in the next week. No need to follow up with neurology as strokes are chronic in nature.   -- SLP - passed bedside swallow, no further need for SLP therapies   -- PT recommending discharge to home      Atrial fibrillation, chronic  Essential Hypertension  Patient reports knowing he has had A-fib for multiple years, although we do not have available records for this.  Previously told to be on Eliquis but then he reports his PCP advised him to stop Eliquis and start  mg daily.  This morning, EKG showed A-fib with bradycardia and multiple 2-3 beat pauses.  Patient asymptomatic during this.  - Echocardiogram today with normal LV function 60-65%, no significant valvular disease   - Appreciate input from cardiology.  - Stop atenolol  - Start metoprolol 25 mg twice daily tomorrow  - Discharge on 30-day event monitor (ordered by cardiology)   - We will continue amlodipine 10 mg daily  - Restart Eliquis 5 mg BID   -- discontinue Aspirin   - Follow-up in cardiology clinic in 1 month     Elevated TSH   TSH 5.16 with normal T4. Possible stress response   -- Recommend repeating as outpatient in 4 weeks     Dehydration/ BLANCHE :  --Creatinine 1.36 on admission but normalized today with rehydration.     History of colon cancer, per patient  --Patient reports history of colon \"tumor\" that was removed in 2015.  Abdominal scar noted.     Discharge Planning  Housing insecurity   Was brought to my attention this afternoon that patient has unstable " housing situation.  I discussed this with patient.  Per patient, he currently resides in an apartment that is rented by his cousin and has been there for several months.  His cousin is currently out of state ([place]) but this cousing is returning home shortly and thus the patient will be left without a place to live. The pt reports he is on a fixed income with prison and social security income. He has been actively searching for a new apartment but due to rising rent costs he has been unable to find a place he can afford and he is worried that he will end up on the streets. He is requesting social work assistance with finding a place to live.  -- Social work consult placed   -- Social work met with patient they have provided [facility] emergency programs, [facility] front door phone number and shelter resources.    Patient was seen and examined on the day of discharge , he is feeling well, does not have any complaints , I did review the discharge medications and instructions with the patient and plan for him to follow up with the PCP after the hospitalization .patient was in agreement , he is discharged in stable condition back to his cousin home, paper work is provided for housing assistance , patient will be calling them after the discharge     Consultations This Hospital Stay   PATIENT LEARNING CENTER IP CONSULT  NEUROLOGY IP STROKE CONSULT  SPEECH LANGUAGE PATH ADULT IP CONSULT  PHARMACY IP CONSULT  PHARMACY IP CONSULT  PHARMACY IP CONSULT  PHYSICAL THERAPY ADULT IP CONSULT  OCCUPATIONAL THERAPY ADULT IP CONSULT  CARE MANAGEMENT / SOCIAL WORK IP CONSULT  PHARMACY LIAISON FOR MEDICATION COVERAGE CONSULT  CARDIOLOGY IP CONSULT  CARE MANAGEMENT / SOCIAL WORK IP CONSULT  SMOKING CESSATION PROGRAM IP CONSULT    Code Status   Full Code    Time Spent on this Encounter   ILaura MD, personally saw the patient today and spent greater than 30 minutes discharging this patient.       Laura Mtz,  MD  [facility]  [address] [facility] [address]  Phone: [phone]  ______________________________________________________________________    Physical Exam   Vital Signs: Temp: 97.6  F (36.4  C) Temp src: Oral BP: (!) 135/92 Pulse: 56   Resp: 16 SpO2: 99 % O2 Device: None (Room air)    Weight: 184 lbs 1.6 oz    Physical Exam  Vitals and nursing note reviewed.   Constitutional:       Appearance: He is well-developed.   HENT:      Head: Normocephalic and atraumatic.   Eyes:      Pupils: Pupils are equal, round, and reactive to light.   Neck:      Thyroid: No thyromegaly.   Cardiovascular:      Rate and Rhythm: Normal rate and regular rhythm.      Heart sounds: Normal heart sounds.   Pulmonary:      Effort: Pulmonary effort is normal. No respiratory distress.      Breath sounds: Normal breath sounds.   Abdominal:      General: Bowel sounds are normal. There is no distension.      Palpations: Abdomen is soft.   Musculoskeletal:         General: No tenderness. Normal range of motion.      Cervical back: Normal range of motion and neck supple.   Skin:     General: Skin is warm and dry.   Neurological:      Mental Status: He is alert and oriented to person, place, and time.   Psychiatric:         Behavior: Behavior normal.          Primary Care Physician   Physician No Ref-Primary    Discharge Orders       Significant Results and Procedures   Results for orders placed or performed during the hospital encounter of 08/07/23   MRA Angiogram Head w/o Contrast    Narrative    EXAM: MR BRAIN W/O CONTRAST, MRA NECK (CAROTIDS) W/O CONTRAST, MRA BRAIN ([place] [facility] [place]) W/O CONTRAST  LOCATION: [facility]  DATE: 8/7/2023    INDICATION: Sudden onset dizziness, A-fib not on anticoagulant, eval for posterior circulation CVA  COMPARISON: None.  TECHNIQUE:   1) Multiplanar multisequence head MRI without and with intravenous contrast, including  sagittal T1, axial DWI, axial T2 fat-sat and axial T2 FLAIR fat-sat sequences. Of note, the patient was unable to tolerate/declined to continue the remainder of the   planned study and no susceptibility-sensitive sequences were able to be obtained.  2) 3D time-of-flight head MRA without intravenous contrast.  3) 2-D time-of-flight neck MRA without contrast. Stenosis measurements made according to NASCET criteria unless otherwise specified. Of note, the patient was unable to tolerate/declined to continue the planned postcontrast portions of this MRA neck exam.    FINDINGS:  HEAD MRI:  INTRACRANIAL CONTENTS: No abnormal intracranial restricted diffusion is identified to suggest recent infarct. There are multiple small chronic infarcts involving the bilateral cerebellar hemispheres. Small area of T2 hyperintense signal in the right   pelvis may represent dilated perivascular spaces or possibly a small chronic infarct. Small area of T2 FLAIR hyperintense signal involving the subcortical white matter of the right parietal lobe (series 801 image 19), with some associated volume loss,   likely representing a small chronic cortical/subcortical infarct. Mild to moderate extent of patchy nonspecific T2 FLAIR hyperintense signal abnormality in the cerebral white matter, likely due to chronic small vessel ischemic disease. Somewhat prominent   dilated perivascular spaces in the bilateral basal ganglia regions. There is mild generalized brain parenchymal volume loss. There is mild presumed ex vacuo dilatation of the supratentorial ventricular system. No definite acute intracranial hemorrhage   identified, accounting for limitations of technique. No extra-axial fluid collection, contour deforming mass lesion, or significant mass effect/herniation.    SELLA: No abnormality accounting for technique.    OSSEOUS STRUCTURES/SOFT TISSUES: Mildly heterogeneous nonspecific marrow signal change in the upper cervical spine with overall  probable benign pattern. The calvarial/skull base and facial marrow signal appears unremarkable. The major intracranial   vascular flow voids are maintained.     ORBITS: No abnormality accounting for technique.     SINUSES/MASTOIDS: Mild mucosal thickening seen primarily in the ethmoid sinuses. Small retention cyst or polyp in the left maxillary sinus. No middle ear or mastoid effusion.     HEAD MRA:   ANTERIOR CIRCULATION: There is mild luminal irregularity involving the right greater than left carotid siphons without significant stenosis. No high-grade stenosis involving the proximal branches of the anterior cerebral and middle cerebral arteries is   noted.    POSTERIOR CIRCULATION: There appears to be at least moderate focal stenosis of the distal P2/proximal P3 segment of the left posterior cerebral artery (series 505 image 7). Otherwise, mild scattered stenoses involving the bilateral posterior cerebral   arteries that may be due to atherosclerosis. The bilateral vertebral arteries and the basilar artery are patent. No aneurysm or high flow vascular malformation is seen.    NECK MRA:   Evaluation is somewhat limited due to artifact. There are apparent areas of focal signal dropout along the posteromedial aspects of both proximal internal carotid arteries that are most likely due to artifact (series 2 images 24-25). Otherwise, the   bilateral common carotid arteries and cervical internal carotid arteries appear to be patent where visualized without definite flow-limiting stenosis. Expected areas of segmental signal dropout in the V3 segments of both vertebral arteries is most likely   artifactual. Otherwise, the bilateral cervical vertebral arteries appear to be patent with the exception of a focal area of nonspecific moderate stenosis involving the mid V2 segment of the left vertebral artery (series 700 image 22).    Bovine origin left common carotid artery. No significant stenosis at the origin of the great  vessels.      Impression    IMPRESSION:  HEAD MRI:   1.  Evaluation mildly limited due to patient unable to tolerate/declined to complete the entire planned/protocoled exam. No definite acute intracranial process. Specifically, no findings of acute/early subacute infarct.  2.  Multiple chronic cerebellar infarcts.  3.  Small chronic cortical/subcortical right parietal infarct.  4.  Brain atrophy and presumed chronic small vessel ischemic changes.    HEAD MRA:   1.  Moderate focal stenosis of the distal P2/proximal P3 segment of the left posterior cerebral artery.  2.  Otherwise, mild scattered areas of luminal irregularity/narrowing involving the anterior and posterior circulation, as described. This may be due to atherosclerosis.  3.  No intracranial aneurysm or high flow vascular malformation.    NECK MRA:  1.  Evaluation somewhat limited due to patient unable to tolerate/declined to complete the entire planned/protocoled exam. Evaluation limited by noncontrast technique. Only noncontrast 2-D time-of-flight images were able to be obtained through the neck.  2.  Focal areas of signal dropout along the posteromedial aspects of both internal carotid arteries are likely due to artifact. No definite flow-limiting stenosis of the internal carotid arteries.  3.  There appears to be up to moderate nonspecific focal stenosis of the V2 segment of the left vertebral artery.  4.  CTA could be considered for further evaluation if clinically warranted.   XR Chest 2 Views    Narrative    EXAM: XR CHEST 2 VIEWS  LOCATION: [facility]  DATE: 8/7/2023    INDICATION: Palpitations, AFib.  COMPARISON: None available.      Impression    IMPRESSION: PA and lateral views of the chest were obtained. Cardiomediastinal silhouette is within normal limits. Asymmetric elevation of the left diaphragm as compared to the right. No suspicious focal pulmonary opacities. No significant pleural   effusion or pneumothorax.    MR Brain w/o Contrast    Narrative    EXAM: MR BRAIN W/O CONTRAST, MRA NECK (CAROTIDS) W/O CONTRAST, MRA BRAIN ([place] [facility] [place]) W/O CONTRAST  LOCATION: [facility]  DATE: 8/7/2023    INDICATION: Sudden onset dizziness, A-fib not on anticoagulant, eval for posterior circulation CVA  COMPARISON: None.  TECHNIQUE:   1) Multiplanar multisequence head MRI without and with intravenous contrast, including sagittal T1, axial DWI, axial T2 fat-sat and axial T2 FLAIR fat-sat sequences. Of note, the patient was unable to tolerate/declined to continue the remainder of the   planned study and no susceptibility-sensitive sequences were able to be obtained.  2) 3D time-of-flight head MRA without intravenous contrast.  3) 2-D time-of-flight neck MRA without contrast. Stenosis measurements made according to NASCET criteria unless otherwise specified. Of note, the patient was unable to tolerate/declined to continue the planned postcontrast portions of this MRA neck exam.    FINDINGS:  HEAD MRI:  INTRACRANIAL CONTENTS: No abnormal intracranial restricted diffusion is identified to suggest recent infarct. There are multiple small chronic infarcts involving the bilateral cerebellar hemispheres. Small area of T2 hyperintense signal in the right   pelvis may represent dilated perivascular spaces or possibly a small chronic infarct. Small area of T2 FLAIR hyperintense signal involving the subcortical white matter of the right parietal lobe (series 801 image 19), with some associated volume loss,   likely representing a small chronic cortical/subcortical infarct. Mild to moderate extent of patchy nonspecific T2 FLAIR hyperintense signal abnormality in the cerebral white matter, likely due to chronic small vessel ischemic disease. Somewhat prominent   dilated perivascular spaces in the bilateral basal ganglia regions. There is mild generalized brain parenchymal volume loss. There is mild presumed ex vacuo  dilatation of the supratentorial ventricular system. No definite acute intracranial hemorrhage   identified, accounting for limitations of technique. No extra-axial fluid collection, contour deforming mass lesion, or significant mass effect/herniation.    SELLA: No abnormality accounting for technique.    OSSEOUS STRUCTURES/SOFT TISSUES: Mildly heterogeneous nonspecific marrow signal change in the upper cervical spine with overall probable benign pattern. The calvarial/skull base and facial marrow signal appears unremarkable. The major intracranial   vascular flow voids are maintained.     ORBITS: No abnormality accounting for technique.     SINUSES/MASTOIDS: Mild mucosal thickening seen primarily in the ethmoid sinuses. Small retention cyst or polyp in the left maxillary sinus. No middle ear or mastoid effusion.     HEAD MRA:   ANTERIOR CIRCULATION: There is mild luminal irregularity involving the right greater than left carotid siphons without significant stenosis. No high-grade stenosis involving the proximal branches of the anterior cerebral and middle cerebral arteries is   noted.    POSTERIOR CIRCULATION: There appears to be at least moderate focal stenosis of the distal P2/proximal P3 segment of the left posterior cerebral artery (series 505 image 7). Otherwise, mild scattered stenoses involving the bilateral posterior cerebral   arteries that may be due to atherosclerosis. The bilateral vertebral arteries and the basilar artery are patent. No aneurysm or high flow vascular malformation is seen.    NECK MRA:   Evaluation is somewhat limited due to artifact. There are apparent areas of focal signal dropout along the posteromedial aspects of both proximal internal carotid arteries that are most likely due to artifact (series 2 images 24-25). Otherwise, the   bilateral common carotid arteries and cervical internal carotid arteries appear to be patent where visualized without definite flow-limiting stenosis.  Expected areas of segmental signal dropout in the V3 segments of both vertebral arteries is most likely   artifactual. Otherwise, the bilateral cervical vertebral arteries appear to be patent with the exception of a focal area of nonspecific moderate stenosis involving the mid V2 segment of the left vertebral artery (series 700 image 22).    Bovine origin left common carotid artery. No significant stenosis at the origin of the great vessels.      Impression    IMPRESSION:  HEAD MRI:   1.  Evaluation mildly limited due to patient unable to tolerate/declined to complete the entire planned/protocoled exam. No definite acute intracranial process. Specifically, no findings of acute/early subacute infarct.  2.  Multiple chronic cerebellar infarcts.  3.  Small chronic cortical/subcortical right parietal infarct.  4.  Brain atrophy and presumed chronic small vessel ischemic changes.    HEAD MRA:   1.  Moderate focal stenosis of the distal P2/proximal P3 segment of the left posterior cerebral artery.  2.  Otherwise, mild scattered areas of luminal irregularity/narrowing involving the anterior and posterior circulation, as described. This may be due to atherosclerosis.  3.  No intracranial aneurysm or high flow vascular malformation.    NECK MRA:  1.  Evaluation somewhat limited due to patient unable to tolerate/declined to complete the entire planned/protocoled exam. Evaluation limited by noncontrast technique. Only noncontrast 2-D time-of-flight images were able to be obtained through the neck.  2.  Focal areas of signal dropout along the posteromedial aspects of both internal carotid arteries are likely due to artifact. No definite flow-limiting stenosis of the internal carotid arteries.  3.  There appears to be up to moderate nonspecific focal stenosis of the V2 segment of the left vertebral artery.  4.  CTA could be considered for further evaluation if clinically warranted.   MRA Angiogram Neck w/o Contrast    Narrative     EXAM: MR BRAIN W/O CONTRAST, MRA NECK (CAROTIDS) W/O CONTRAST, MRA BRAIN ([place]) W/O CONTRAST  LOCATION: [facility]  DATE: 8/7/2023    INDICATION: Sudden onset dizziness, A-fib not on anticoagulant, eval for posterior circulation CVA  COMPARISON: None.  TECHNIQUE:   1) Multiplanar multisequence head MRI without and with intravenous contrast, including sagittal T1, axial DWI, axial T2 fat-sat and axial T2 FLAIR fat-sat sequences. Of note, the patient was unable to tolerate/declined to continue the remainder of the   planned study and no susceptibility-sensitive sequences were able to be obtained.  2) 3D time-of-flight head MRA without intravenous contrast.  3) 2-D time-of-flight neck MRA without contrast. Stenosis measurements made according to NASCET criteria unless otherwise specified. Of note, the patient was unable to tolerate/declined to continue the planned postcontrast portions of this MRA neck exam.    FINDINGS:  HEAD MRI:  INTRACRANIAL CONTENTS: No abnormal intracranial restricted diffusion is identified to suggest recent infarct. There are multiple small chronic infarcts involving the bilateral cerebellar hemispheres. Small area of T2 hyperintense signal in the right   pelvis may represent dilated perivascular spaces or possibly a small chronic infarct. Small area of T2 FLAIR hyperintense signal involving the subcortical white matter of the right parietal lobe (series 801 image 19), with some associated volume loss,   likely representing a small chronic cortical/subcortical infarct. Mild to moderate extent of patchy nonspecific T2 FLAIR hyperintense signal abnormality in the cerebral white matter, likely due to chronic small vessel ischemic disease. Somewhat prominent   dilated perivascular spaces in the bilateral basal ganglia regions. There is mild generalized brain parenchymal volume loss. There is mild presumed ex vacuo dilatation of the supratentorial ventricular  system. No definite acute intracranial hemorrhage   identified, accounting for limitations of technique. No extra-axial fluid collection, contour deforming mass lesion, or significant mass effect/herniation.    SELLA: No abnormality accounting for technique.    OSSEOUS STRUCTURES/SOFT TISSUES: Mildly heterogeneous nonspecific marrow signal change in the upper cervical spine with overall probable benign pattern. The calvarial/skull base and facial marrow signal appears unremarkable. The major intracranial   vascular flow voids are maintained.     ORBITS: No abnormality accounting for technique.     SINUSES/MASTOIDS: Mild mucosal thickening seen primarily in the ethmoid sinuses. Small retention cyst or polyp in the left maxillary sinus. No middle ear or mastoid effusion.     HEAD MRA:   ANTERIOR CIRCULATION: There is mild luminal irregularity involving the right greater than left carotid siphons without significant stenosis. No high-grade stenosis involving the proximal branches of the anterior cerebral and middle cerebral arteries is   noted.    POSTERIOR CIRCULATION: There appears to be at least moderate focal stenosis of the distal P2/proximal P3 segment of the left posterior cerebral artery (series 505 image 7). Otherwise, mild scattered stenoses involving the bilateral posterior cerebral   arteries that may be due to atherosclerosis. The bilateral vertebral arteries and the basilar artery are patent. No aneurysm or high flow vascular malformation is seen.    NECK MRA:   Evaluation is somewhat limited due to artifact. There are apparent areas of focal signal dropout along the posteromedial aspects of both proximal internal carotid arteries that are most likely due to artifact (series 2 images 24-25). Otherwise, the   bilateral common carotid arteries and cervical internal carotid arteries appear to be patent where visualized without definite flow-limiting stenosis. Expected areas of segmental signal dropout in the  V3 segments of both vertebral arteries is most likely   artifactual. Otherwise, the bilateral cervical vertebral arteries appear to be patent with the exception of a focal area of nonspecific moderate stenosis involving the mid V2 segment of the left vertebral artery (series 700 image 22).    Bovine origin left common carotid artery. No significant stenosis at the origin of the great vessels.      Impression    IMPRESSION:  HEAD MRI:   1.  Evaluation mildly limited due to patient unable to tolerate/declined to complete the entire planned/protocoled exam. No definite acute intracranial process. Specifically, no findings of acute/early subacute infarct.  2.  Multiple chronic cerebellar infarcts.  3.  Small chronic cortical/subcortical right parietal infarct.  4.  Brain atrophy and presumed chronic small vessel ischemic changes.    HEAD MRA:   1.  Moderate focal stenosis of the distal P2/proximal P3 segment of the left posterior cerebral artery.  2.  Otherwise, mild scattered areas of luminal irregularity/narrowing involving the anterior and posterior circulation, as described. This may be due to atherosclerosis.  3.  No intracranial aneurysm or high flow vascular malformation.    NECK MRA:  1.  Evaluation somewhat limited due to patient unable to tolerate/declined to complete the entire planned/protocoled exam. Evaluation limited by noncontrast technique. Only noncontrast 2-D time-of-flight images were able to be obtained through the neck.  2.  Focal areas of signal dropout along the posteromedial aspects of both internal carotid arteries are likely due to artifact. No definite flow-limiting stenosis of the internal carotid arteries.  3.  There appears to be up to moderate nonspecific focal stenosis of the V2 segment of the left vertebral artery.  4.  CTA could be considered for further evaluation if clinically warranted.   Echocardiogram Complete     Value    LVEF  60-65%    Narrative     [id number]  [facility]  [id number]  [id number]^JADYN^LIBORIO^CHUCKY     [facility]  Echocardiography Laboratory  [address] [place] [address]     Name: LORI HENSON  MRN: [id number]  : 1955  Study Date: 2023 10:15 AM  Age: 67 yrs  Gender: Male  Patient Location: [facility]  Reason For Study: CVA  Ordering Physician: LIBORIO SALAMANCA  Referring Physician: LIBORIO SALAMANCA  Performed By: James Murillo     BSA: 2.0 m2  Height: 67 in  Weight: 184 lb  HR: 59  BP: 130/79 mmHg  ______________________________________________________________________________  Procedure  Complete Echo Adult. Optison (NDC #[id number]) given intravenously.  ______________________________________________________________________________  Interpretation Summary     1. Normal left ventricular size and function. Left ventricular ejection  fraction of 60-65%.  2. No segmental wall motion abnormalities noted.  3. Mild aortic and tricuspid regurgitation.  No prior study for comparison.  ______________________________________________________________________________  Left Ventricle  The left ventricle is normal in size. There is normal left ventricular wall  thickness. Left ventricular systolic function is normal. The visual ejection  fraction is 60-65%. Diastolic function not assessed due to atrial  fibrillation.     Right Ventricle  The right ventricle is not well visualized.     Atria  There is mild biatrial enlargement.     Mitral Valve  The mitral valve leaflets appear normal. There is no evidence of stenosis,  fluttering, or prolapse. There is trace mitral regurgitation.     Tricuspid Valve  Normal tricuspid valve. There is mild (1+) tricuspid regurgitation. The right  ventricular systolic pressure is approximated at 13.8 mmHg plus the right  atrial pressure.     Aortic Valve  The aortic valve is not well visualized. There is trace aortic regurgitation.  No aortic stenosis is present.     Pulmonic Valve  The pulmonic  valve is not well visualized.     Vessels  Normal size aorta. IVC diameter <2.1 cm collapsing >50% with sniff suggests a  normal RA pressure of 3 mmHg.     Pericardium  There is no pericardial effusion.     Rhythm  Sinus rhythm was noted.  ______________________________________________________________________________  MMode/2D Measurements & Calculations  IVSd: 0.92 cm     LVIDd: 3.8 cm  LVIDs: 2.6 cm  LVPWd: 0.96 cm  FS: 31.3 %  LV mass(C)d: 105.8 grams  LV mass(C)dI: 54.2 grams/m2  Ao root diam: 3.7 cm  asc Aorta Diam: 3.2 cm  LVOT diam: 1.9 cm  LVOT area: 2.9 cm2  LA Volume (BP): 59.4 ml  LA Volume Index (BP): 30.5 ml/m2  RWT: 0.51  TAPSE: 1.3 cm     Doppler Measurements & Calculations  MV E max pravin: 88.1 cm/sec  MV dec slope: 436.7 cm/sec2  MV dec time: 0.20 sec  Ao V2 max: 84.2 cm/sec  Ao max PG: 3.0 mmHg  Ao V2 mean: 60.3 cm/sec  Ao mean P.0 mmHg  Ao V2 VTI: 15.4 cm  JUAN PABLO(I,D): 3.2 cm2  JUAN PABLO(V,D): 2.7 cm2  LV V1 max P.4 mmHg  LV V1 max: 78.0 cm/sec  LV V1 VTI: 17.0 cm  SV(LVOT): 48.9 ml  SI(LVOT): 25.1 ml/m2  PA acc time: 0.14 sec  TR max pravin: 185.5 cm/sec  TR max P.8 mmHg  AV Pravin Ratio (DI): 0.93  JUAN PABLO Index (cm2/m2): 1.6  E/E' av.9  Lateral E/e': 6.9  Medial E/e': 10.8  RV S Pravin: 8.1 cm/sec     ______________________________________________________________________________  Report approved by: Franklin Sadler 2023 12:33 PM             Discharge Medications   Current Discharge Medication List        START taking these medications    Details   apixaban ANTICOAGULANT (ELIQUIS) 5 MG tablet Take 1 tablet (5 mg) by mouth 2 times daily for 30 days  Qty: 60 tablet, Refills: 0    Associated Diagnoses: Atrial fibrillation, unspecified type (H); History of CVA (cerebrovascular accident)      atorvastatin (LIPITOR) 20 MG tablet Take 1 tablet (20 mg) by mouth every evening for 30 days  Qty: 30 tablet, Refills: 0    Associated Diagnoses: Atrial fibrillation, unspecified type (H)      metoprolol  tartrate (LOPRESSOR) 25 MG tablet Take 1 tablet (25 mg) by mouth 2 times daily for 30 days  Qty: 60 tablet, Refills: 0    Associated Diagnoses: Atrial fibrillation, unspecified type (H)           CONTINUE these medications which have NOT CHANGED    Details   amLODIPine (NORVASC) 10 MG tablet Take 10 mg by mouth daily      CALCIUM PO Take 1 tablet by mouth daily      multivitamin, therapeutic (THERA-VIT) TABS tablet Take 1 tablet by mouth daily      SELENIUM PO Take 1 tablet by mouth daily      vitamin B-12 (CYANOCOBALAMIN) 2500 MCG sublingual tablet Take 2,500 mcg by mouth daily      vitamin C with B complex (B COMPLEX-C) tablet Take 1 tablet by mouth daily      VITAMIN D PO Take 1 tablet by mouth daily      zinc gluconate 50 MG tablet Take 50 mg by mouth daily           STOP taking these medications       aspirin (ASA) 325 MG EC tablet Comments:   Reason for Stopping:         atenolol (TENORMIN) 50 MG tablet Comments:   Reason for Stopping:             Allergies   No Known Allergies

## 2023-08-09 NOTE — PROGRESS NOTES
Discharge education provided to pt. Pt understands teaching. Aware of next steps, follow ups, when to call, and medication regimen. Questions answers. Discharge packet and discharge medications given to pt to take home. Pt currently waiting for cardiac monitor. Pt will be discharging home with family.

## 2023-08-09 NOTE — PLAN OF CARE
Observation goals  PRIOR TO DISCHARGE       -diagnostic tests and consults completed and resulted- Not Met, still working with SW  -vital signs normal or at patient baseline- Met  -returns to baseline functional status- Met  Nurse to notify provider when observation goals have been met and patient is ready for discharge

## 2023-08-10 LAB
ATRIAL RATE - MUSE: 250 BPM
DIASTOLIC BLOOD PRESSURE - MUSE: NORMAL MMHG
INTERPRETATION ECG - MUSE: NORMAL
P AXIS - MUSE: NORMAL DEGREES
PR INTERVAL - MUSE: NORMAL MS
QRS DURATION - MUSE: 88 MS
QT - MUSE: 440 MS
QTC - MUSE: 385 MS
R AXIS - MUSE: 67 DEGREES
SYSTOLIC BLOOD PRESSURE - MUSE: NORMAL MMHG
T AXIS - MUSE: 66 DEGREES
VENTRICULAR RATE- MUSE: 46 BPM

## 2023-09-13 ENCOUNTER — TELEPHONE (OUTPATIENT)
Dept: CARDIOLOGY | Facility: CLINIC | Age: 68
End: 2023-09-13
Payer: COMMERCIAL

## 2023-09-13 NOTE — TELEPHONE ENCOUNTER
[facility] Call Center    Phone Message    May a detailed message be left on voicemail: yes     Reason for Call: Other: Patient was seen in the hospital on 08/07 by Dr. Mejia. They were given a heart monitor to wear for 30 days. Patient called and will like to know what are the options they have on returning the monitor, as they stated they don't have a way to send it back or drop it off. Please call patient back to further discuss.     Action Taken: Other: Cardiology    Travel Screening: Not Applicable    Thank you!  [facility]

## 2023-09-13 NOTE — TELEPHONE ENCOUNTER
Spoke with patient. Patient states that he needs a new return envelope to return the monitor. Instructed patient to contact [facility] directly to get a new envelope. Provided patient with phone number. No further questions.

## 2024-11-08 NOTE — PLAN OF CARE
Observation goals  PRIOR TO DISCHARGE       -diagnostic tests and consults completed and resulted- Not Met, still working with SW  -vital signs normal or at patient baseline- Met  -returns to baseline functional status- Met  Nurse to notify provider when observation goals have been met and patient is ready for discharge         Name of Medication(s) Requested:  Requested Prescriptions     Pending Prescriptions Disp Refills    amLODIPine (NORVASC) 5 MG tablet [Pharmacy Med Name: AMLODIPINE BESYLATE 5 MG TAB] 30 tablet 5     Sig: TAKE 1 TABLET BY MOUTH ONCE DAILY       Medication is on current medication list Yes    Dosage and directions were verified? Yes    Quantity verified: 30 day supply     Pharmacy Verified?  Yes    Last Appointment:  11/16/2023    Future appts:  No future appointments.     (If no appt send self scheduling link. .[email or web address])  Scheduling request sent?     [x] Yes  [] No    Does patient need updated?  [] Yes  [x] No